# Patient Record
Sex: MALE | Race: WHITE | NOT HISPANIC OR LATINO
[De-identification: names, ages, dates, MRNs, and addresses within clinical notes are randomized per-mention and may not be internally consistent; named-entity substitution may affect disease eponyms.]

---

## 2022-04-12 ENCOUNTER — APPOINTMENT (OUTPATIENT)
Dept: SURGERY | Facility: CLINIC | Age: 57
End: 2022-04-12
Payer: COMMERCIAL

## 2022-04-12 VITALS
SYSTOLIC BLOOD PRESSURE: 143 MMHG | OXYGEN SATURATION: 99 % | HEIGHT: 72 IN | TEMPERATURE: 97.6 F | HEART RATE: 81 BPM | RESPIRATION RATE: 17 BRPM | BODY MASS INDEX: 32.51 KG/M2 | DIASTOLIC BLOOD PRESSURE: 89 MMHG | WEIGHT: 240 LBS

## 2022-04-12 DIAGNOSIS — K42.9 UMBILICAL HERNIA W/OUT OBSTRUCTION OR GANGRENE: ICD-10-CM

## 2022-04-12 DIAGNOSIS — Z87.891 PERSONAL HISTORY OF NICOTINE DEPENDENCE: ICD-10-CM

## 2022-04-12 DIAGNOSIS — Z87.39 PERSONAL HISTORY OF OTHER DISEASES OF THE MUSCULOSKELETAL SYSTEM AND CONNECTIVE TISSUE: ICD-10-CM

## 2022-04-12 PROCEDURE — 99203 OFFICE O/P NEW LOW 30 MIN: CPT

## 2022-04-12 RX ORDER — ADALIMUMAB 20MG/0.4ML
20 KIT SUBCUTANEOUS
Refills: 0 | Status: ACTIVE | COMMUNITY

## 2022-04-12 RX ORDER — ATORVASTATIN CALCIUM 80 MG/1
TABLET, FILM COATED ORAL
Refills: 0 | Status: ACTIVE | COMMUNITY

## 2022-04-12 RX ORDER — ALPRAZOLAM 2 MG/1
TABLET ORAL
Refills: 0 | Status: ACTIVE | COMMUNITY

## 2022-04-12 NOTE — CONSULT LETTER
[Dear  ___] : Dear  [unfilled], [Consult Letter:] : I had the pleasure of evaluating your patient, [unfilled]. [Please see my note below.] : Please see my note below. [Consult Closing:] : Thank you very much for allowing me to participate in the care of this patient.  If you have any questions, please do not hesitate to contact me. [Sincerely,] : Sincerely, [FreeTextEntry3] : Gene Gray M.D., F.A.C.S, F.A.S.C.R.S

## 2022-04-12 NOTE — PHYSICAL EXAM
[Normal Breath Sounds] : Normal breath sounds [Normal Heart Sounds] : normal heart sounds [No Rash or Lesion] : No rash or lesion [Alert] : alert [Oriented to Person] : oriented to person [Oriented to Place] : oriented to place [Oriented to Time] : oriented to time [Calm] : calm [JVD] : no jugular venous distention  [No HSM] : no hepatosplenomegaly [de-identified] : wnl [de-identified] : wnl [de-identified] : Soft, obese nontender, small fat-containing reducible umbilical hernia [de-identified] : full rom

## 2022-04-12 NOTE — ASSESSMENT
[FreeTextEntry1] : In summary the patient has a small symptomatic fat-containing reducible umbilical hernia.  I recommended that this be electively repaired with mesh.  I explained the risks benefits and alternatives of surgery including the rare complications of bleeding mesh infection and recurrence.

## 2022-05-06 ENCOUNTER — APPOINTMENT (OUTPATIENT)
Dept: OTOLARYNGOLOGY | Facility: CLINIC | Age: 57
End: 2022-05-06
Payer: COMMERCIAL

## 2022-05-06 VITALS
WEIGHT: 240 LBS | HEIGHT: 72 IN | DIASTOLIC BLOOD PRESSURE: 82 MMHG | SYSTOLIC BLOOD PRESSURE: 132 MMHG | BODY MASS INDEX: 32.51 KG/M2 | HEART RATE: 87 BPM

## 2022-05-06 DIAGNOSIS — Z78.9 OTHER SPECIFIED HEALTH STATUS: ICD-10-CM

## 2022-05-06 PROCEDURE — 31575 DIAGNOSTIC LARYNGOSCOPY: CPT

## 2022-05-06 PROCEDURE — 99244 OFF/OP CNSLTJ NEW/EST MOD 40: CPT | Mod: 25

## 2022-05-06 NOTE — PHYSICAL EXAM
[Midline] : trachea located in midline position [Normal] : no rashes [] : septum deviated to the left [Laryngoscopy Performed] : laryngoscopy was performed, see procedure section for findings [FreeTextEntry1] : Obese, HODAN unable to tolerate CPAP [de-identified] : Thick [de-identified] : Hypertrophy

## 2022-05-06 NOTE — HISTORY OF PRESENT ILLNESS
[de-identified] : 56 year old male here for intial consultation for sleep apnea/Inspire \par Patient's BMI 32.55 \par Reports using CPAP machine\par Reports removal of CPAP machine in the night\par Reports CPAP machine is annoying and uncomfortable \par Reports attempting using mouth guard in the past but states it was uncomfortable. \par Reports alcohol and occasionally smokes cigars. \par Patient has tried various types of masks without relief\par States mask cause skin irritation, dry mouth and nose bleeds \par Patient has sleeping partner who complains about the noise of CPAP machine, stating it keeps them awake at night.\par Reports weight loss of 40lbs with with no relief from sleep apnea\par Patient comorbidities include daytime fatigue, difficulty driving.

## 2022-05-06 NOTE — DATA REVIEWED
[de-identified] : Patient's sleep test was severe enough to warrant CPAP but does not know any numbers.

## 2022-05-06 NOTE — CONSULT LETTER
[Dear  ___] : Dear  [unfilled], [Consult Letter:] : I had the pleasure of evaluating your patient, [unfilled]. [Please see my note below.] : Please see my note below. [Consult Closing:] : Thank you very much for allowing me to participate in the care of this patient.  If you have any questions, please do not hesitate to contact me. [Sincerely,] : Sincerely, [FreeTextEntry3] : Yobany Perez MD, LENARD, FACS\par  Department Otolaryngology\par Director of [facility]\par Professor of Otolaryngology, \par [employer]\par

## 2022-05-06 NOTE — PROCEDURE
[Video Captured] : video captured and filed [Image(s) Captured] : image(s) captured and filed [Unable to Cooperate with Mirror] : patient unable to cooperate with mirror [Obstruction] : acute airway obstruction evaluation [Complicated Symptoms] : complicated symptoms requiring more thorough examination than provided by mirror [Topical Lidocaine] : topical lidocaine [Oxymetazoline HCl] : oxymetazoline HCl [Flexible Endoscope] : examined with the flexible endoscope [Serial Number: ___] : Serial Number: [unfilled] [Velopharynx ___ %] : the velopharynx was [unfilled]U% collapsed [Normal] : the false vocal folds were pink and regular, the ventricular sulcus was open, the true vocal folds were glistening white, tense and of equal length, mobility, and height [True Vocal Cords Paralysis] : no true vocal cord paralysis [True Vocal Cords Erythematous] : no true vocal cord edema [True Vocal Cords Acosta's Nodules] : no true vocal cord nodules [Glottis Arytenoid Cartilages] : no arytenoid granulomas [Glottis Arytenoid Cartilages Erythema] : no arytenoid erythema [Arytenoid Edema ___ /4] : bilaterally were normal [Arytenoid Erythema ___ /4] : bilaterally were normal [de-identified] : Patient was placed in the examination chair in a sitting position. The nose was decongested with oxymetazoline nasal solution. The airway was anesthetized with 4% Xylocaine.  The back of the throat was anesthetized with Cetacaine. Direct flexible/rigid video endoscopy was performed. Findings revealed:\par Patient has a deviated septum to the left nasopharynx positive Rockwell maneuver appearing to close in an anterior to posterior direction thick base of tongue larynx epiglottis vocal cords normal. [de-identified] : HODAN

## 2022-05-11 ENCOUNTER — NON-APPOINTMENT (OUTPATIENT)
Age: 57
End: 2022-05-11

## 2022-05-12 ENCOUNTER — OUTPATIENT (OUTPATIENT)
Dept: OUTPATIENT SERVICES | Facility: HOSPITAL | Age: 57
LOS: 1 days | End: 2022-05-12
Payer: COMMERCIAL

## 2022-05-12 VITALS
TEMPERATURE: 98 F | WEIGHT: 240.08 LBS | RESPIRATION RATE: 18 BRPM | OXYGEN SATURATION: 98 % | HEART RATE: 72 BPM | DIASTOLIC BLOOD PRESSURE: 80 MMHG | SYSTOLIC BLOOD PRESSURE: 110 MMHG | HEIGHT: 70.5 IN

## 2022-05-12 DIAGNOSIS — G47.33 OBSTRUCTIVE SLEEP APNEA (ADULT) (PEDIATRIC): ICD-10-CM

## 2022-05-12 DIAGNOSIS — Z98.890 OTHER SPECIFIED POSTPROCEDURAL STATES: Chronic | ICD-10-CM

## 2022-05-12 LAB
ALBUMIN SERPL ELPH-MCNC: 4.3 G/DL — SIGNIFICANT CHANGE UP (ref 3.3–5)
ALP SERPL-CCNC: 82 U/L — SIGNIFICANT CHANGE UP (ref 40–120)
ALT FLD-CCNC: 49 U/L — HIGH (ref 4–41)
ANION GAP SERPL CALC-SCNC: 14 MMOL/L — SIGNIFICANT CHANGE UP (ref 7–14)
AST SERPL-CCNC: 42 U/L — HIGH (ref 4–40)
BILIRUB SERPL-MCNC: 0.4 MG/DL — SIGNIFICANT CHANGE UP (ref 0.2–1.2)
BUN SERPL-MCNC: 14 MG/DL — SIGNIFICANT CHANGE UP (ref 7–23)
CALCIUM SERPL-MCNC: 9.7 MG/DL — SIGNIFICANT CHANGE UP (ref 8.4–10.5)
CHLORIDE SERPL-SCNC: 101 MMOL/L — SIGNIFICANT CHANGE UP (ref 98–107)
CO2 SERPL-SCNC: 24 MMOL/L — SIGNIFICANT CHANGE UP (ref 22–31)
CREAT SERPL-MCNC: 0.87 MG/DL — SIGNIFICANT CHANGE UP (ref 0.5–1.3)
EGFR: 102 ML/MIN/1.73M2 — SIGNIFICANT CHANGE UP
GLUCOSE SERPL-MCNC: 75 MG/DL — SIGNIFICANT CHANGE UP (ref 70–99)
HCT VFR BLD CALC: 44.2 % — SIGNIFICANT CHANGE UP (ref 39–50)
HGB BLD-MCNC: 14.5 G/DL — SIGNIFICANT CHANGE UP (ref 13–17)
MCHC RBC-ENTMCNC: 28.3 PG — SIGNIFICANT CHANGE UP (ref 27–34)
MCHC RBC-ENTMCNC: 32.8 GM/DL — SIGNIFICANT CHANGE UP (ref 32–36)
MCV RBC AUTO: 86.2 FL — SIGNIFICANT CHANGE UP (ref 80–100)
NRBC # BLD: 0 /100 WBCS — SIGNIFICANT CHANGE UP
NRBC # FLD: 0 K/UL — SIGNIFICANT CHANGE UP
PLATELET # BLD AUTO: 275 K/UL — SIGNIFICANT CHANGE UP (ref 150–400)
POTASSIUM SERPL-MCNC: 3.6 MMOL/L — SIGNIFICANT CHANGE UP (ref 3.5–5.3)
POTASSIUM SERPL-SCNC: 3.6 MMOL/L — SIGNIFICANT CHANGE UP (ref 3.5–5.3)
PROT SERPL-MCNC: 8.6 G/DL — HIGH (ref 6–8.3)
RBC # BLD: 5.13 M/UL — SIGNIFICANT CHANGE UP (ref 4.2–5.8)
RBC # FLD: 12.9 % — SIGNIFICANT CHANGE UP (ref 10.3–14.5)
SODIUM SERPL-SCNC: 139 MMOL/L — SIGNIFICANT CHANGE UP (ref 135–145)
WBC # BLD: 7.06 K/UL — SIGNIFICANT CHANGE UP (ref 3.8–10.5)
WBC # FLD AUTO: 7.06 K/UL — SIGNIFICANT CHANGE UP (ref 3.8–10.5)

## 2022-05-12 PROCEDURE — 93010 ELECTROCARDIOGRAM REPORT: CPT

## 2022-05-12 RX ORDER — SODIUM CHLORIDE 9 MG/ML
1000 INJECTION, SOLUTION INTRAVENOUS
Refills: 0 | Status: DISCONTINUED | OUTPATIENT
Start: 2022-05-17 | End: 2022-05-31

## 2022-05-12 NOTE — H&P PST ADULT - NSICDXFAMILYHX_GEN_ALL_CORE_FT
FAMILY HISTORY:  Sibling  Still living? Yes, Estimated age: Age Unknown  FH: heart attack, Age at diagnosis: Age Unknown

## 2022-05-12 NOTE — H&P PST ADULT - PROBLEM SELECTOR PLAN 1
Pt. is scheduled for a drug induced sleep endoscopy 5/17/22.  Pt., verbalized understanding of instructions.

## 2022-05-12 NOTE — H&P PST ADULT - NSICDXPASTMEDICALHX_GEN_ALL_CORE_FT
PAST MEDICAL HISTORY:  2019 novel coronavirus disease (COVID-19) 1/2022    Ankylosing spondylitis     Crohn's disease     Hyperlipidemia     Sleep apnea      PAST MEDICAL HISTORY:  2019 novel coronavirus disease (COVID-19) 1/2022    Ankylosing spondylitis     Crohn's disease     Hyperlipidemia     Iritis of right eye     Obese     Sleep apnea

## 2022-05-14 LAB — SARS-COV-2 N GENE NPH QL NAA+PROBE: NOT DETECTED

## 2022-05-16 ENCOUNTER — TRANSCRIPTION ENCOUNTER (OUTPATIENT)
Age: 57
End: 2022-05-16

## 2022-05-16 NOTE — ASU PATIENT PROFILE, ADULT - NSICDXPASTMEDICALHX_GEN_ALL_CORE_FT
PAST MEDICAL HISTORY:  2019 novel coronavirus disease (COVID-19) 1/2022    Ankylosing spondylitis     Crohn's disease     Hyperlipidemia     Iritis of right eye     Obese     Sleep apnea

## 2022-05-16 NOTE — ASU PATIENT PROFILE, ADULT - NS MD HP PRESSURE ULCER DRAIN
DISPLAY PLAN FREE TEXT
no

## 2022-05-16 NOTE — ASU PATIENT PROFILE, ADULT - FALL HARM RISK - UNIVERSAL INTERVENTIONS
Bed in lowest position, wheels locked, appropriate side rails in place/Call bell, personal items and telephone in reach/Instruct patient to call for assistance before getting out of bed or chair/Non-slip footwear when patient is out of bed/[place] to call system/Physically safe environment - no spills, clutter or unnecessary equipment/Purposeful Proactive Rounding/Room/bathroom lighting operational, light cord in reach

## 2022-05-16 NOTE — ASU PATIENT PROFILE, ADULT - VISION (WITH CORRECTIVE LENSES IF THE PATIENT USUALLY WEARS THEM):
wear glaasses/Normal vision: sees adequately in most situations; can see medication labels, newsprint

## 2022-05-17 ENCOUNTER — OUTPATIENT (OUTPATIENT)
Dept: OUTPATIENT SERVICES | Facility: HOSPITAL | Age: 57
LOS: 1 days | Discharge: HOME | End: 2022-05-17
Payer: COMMERCIAL

## 2022-05-17 ENCOUNTER — TRANSCRIPTION ENCOUNTER (OUTPATIENT)
Age: 57
End: 2022-05-17

## 2022-05-17 ENCOUNTER — APPOINTMENT (OUTPATIENT)
Dept: OTOLARYNGOLOGY | Facility: HOSPITAL | Age: 57
End: 2022-05-17

## 2022-05-17 VITALS
WEIGHT: 240.08 LBS | OXYGEN SATURATION: 95 % | RESPIRATION RATE: 16 BRPM | HEIGHT: 70.5 IN | TEMPERATURE: 98 F | DIASTOLIC BLOOD PRESSURE: 80 MMHG | SYSTOLIC BLOOD PRESSURE: 123 MMHG | HEART RATE: 82 BPM

## 2022-05-17 VITALS
RESPIRATION RATE: 18 BRPM | SYSTOLIC BLOOD PRESSURE: 144 MMHG | DIASTOLIC BLOOD PRESSURE: 83 MMHG | TEMPERATURE: 98 F | HEART RATE: 77 BPM | OXYGEN SATURATION: 99 %

## 2022-05-17 DIAGNOSIS — Z98.890 OTHER SPECIFIED POSTPROCEDURAL STATES: Chronic | ICD-10-CM

## 2022-05-17 DIAGNOSIS — G47.33 OBSTRUCTIVE SLEEP APNEA (ADULT) (PEDIATRIC): ICD-10-CM

## 2022-05-17 PROCEDURE — 42975 DISE EVAL SLP DO BRTH FLX DX: CPT | Mod: GC

## 2022-05-17 NOTE — ASU DISCHARGE PLAN (ADULT/PEDIATRIC) - FOLLOW UP APPOINTMENTS
601 May also call Recovery Room (PACU) 24/7 @ [phone]/[facility]

## 2022-05-17 NOTE — ASU DISCHARGE PLAN (ADULT/PEDIATRIC) - NS MD DC FALL RISK RISK
For information on Fall & Injury Prevention, visit: [email or web address] OR  [email or web address] OR  https://www.cdc.gov/steadi/patient.html

## 2022-05-17 NOTE — ASU DISCHARGE PLAN (ADULT/PEDIATRIC) - CARE PROVIDER_API CALL
Yobany Perez; LENARD)  Otolaryngology  [address] [place] [id number]  Phone: [phone]  Fax: [phone]  Follow Up Time: 1 week

## 2022-06-09 ENCOUNTER — APPOINTMENT (OUTPATIENT)
Dept: PULMONOLOGY | Facility: CLINIC | Age: 57
End: 2022-06-09
Payer: COMMERCIAL

## 2022-06-09 VITALS
SYSTOLIC BLOOD PRESSURE: 125 MMHG | OXYGEN SATURATION: 95 % | HEART RATE: 85 BPM | WEIGHT: 238.31 LBS | BODY MASS INDEX: 32.28 KG/M2 | RESPIRATION RATE: 15 BRPM | TEMPERATURE: 97.4 F | HEIGHT: 72 IN | DIASTOLIC BLOOD PRESSURE: 84 MMHG

## 2022-06-09 PROCEDURE — 99203 OFFICE O/P NEW LOW 30 MIN: CPT

## 2022-06-09 NOTE — HISTORY OF PRESENT ILLNESS
[FreeTextEntry1] : 58 yo M presents for initial evaluation of sleep disordered breathing\par Referred by Dr. Perez. \par PMH: Crohn's disease, Ankylosing spondylitis on Humira, chronic Insomnia for which he takes Xanax, HLD\par Diagnosed with HODAN approx 3 years ago, first tried OMAD which he couldn't tolerate and on CPAP therapy since, has used a FFM - very uncomfortable, now using NP. Machine/pressure and mask\par Meds: Xanax, Humira, lipitor\par Tried multiple medications for insomnia - Did not tolerate Ambien - gave him "bad thoughts"\par \par Sleep history: \par Main complaints of nonrestorative sleep, severe snoring and daytime somnolence.\par Bed: 12 am, no difficultly falling asleep, wakes 1-2x to urinate, out of bed at 8 am \par Unrefreshed upon awakening. 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 \par EDS with ESS of 10\par \par Quality metrics:\par Tobacco use- never \par ESS 10\par \par Vaccines: \par COVID: Pfizerx3, booster August 2021\par Influenza: fall 2021\par Pneumococcal: not yet \par \par

## 2022-06-09 NOTE — CONSULT LETTER
[Dear  ___] : Dear  [unfilled], [Consult Letter:] : I had the pleasure of evaluating your patient, [unfilled]. [( Thank you for referring [unfilled] for consultation for _____ )] : Thank you for referring [unfilled] for consultation for [unfilled] [Please see my note below.] : Please see my note below. [Consult Closing:] : Thank you very much for allowing me to participate in the care of this patient.  If you have any questions, please do not hesitate to contact me. [Sincerely,] : Sincerely, [FreeTextEntry2] : Dr. Yobany Perez [FreeTextEntry3] : Brenda Person MD, FAASM\par  of Medicine\par Associate , Fellowship in Pulmonary and Critical Care Medicine\par Division of Pulmonary, Critical Care & Sleep Medicine\par [employer] [facility]\par \par \par  [DrVictoriano  ___] : Dr. ARMENTA

## 2022-06-09 NOTE — PHYSICAL EXAM
[General Appearance - Well Developed] : well developed [General Appearance - Well Nourished] : well nourished [General Appearance - In No Acute Distress] : no acute distress [Normal Conjunctiva] : the conjunctiva exhibited no abnormalities [Eyelids - No Xanthelasma] : the eyelids demonstrated no xanthelasmas [Low Lying Soft Palate] : low lying soft palate [Enlarged Base of the Tongue] : enlargement of the base of the tongue [II] : II [Neck Appearance] : the appearance of the neck was normal [Heart Rate And Rhythm] : heart rate was normal and rhythm regular [Heart Sounds] : normal S1 and S2 [] : no respiratory distress [Respiration, Rhythm And Depth] : normal respiratory rhythm and effort [Exaggerated Use Of Accessory Muscles For Inspiration] : no accessory muscle use [Auscultation Breath Sounds / Voice Sounds] : lungs were clear to auscultation bilaterally [Abnormal Walk] : normal gait [Motor Tone] : muscle strength and tone were normal [Nail Clubbing] : no clubbing of the fingernails [Cyanosis, Localized] : no localized cyanosis [Skin Color & Pigmentation] : normal skin color and pigmentation [Skin Lesions] : no skin lesions

## 2022-06-09 NOTE — REVIEW OF SYSTEMS
[Obesity] : obesity [Difficulty Initiating Sleep] : difficulty falling asleep [Difficulty Maintaining Sleep] : no difficulty maintaining sleep [Chronic Insomnia] : chronic  insomnia [Lower Extremity Discomfort] : no lower extremity discomfort [Unusual Sleep Behavior] : no unusual sleep behavior [Negative] : Psychiatric [FreeTextEntry3] : per hpi [FreeTextEntry8] : per hpi [FreeTextEntry6] : per hpi

## 2022-06-09 NOTE — ASSESSMENT
[FreeTextEntry1] : 56 yo M with HODAN on CPAP therapy, who presents for Inspire evaluation. He is intolerant of noninvasive therapy including oral appliance and CPAP.\par Has been making lifestyle modifications the past 6 months and  has lost approximately 40 lbs\par Continues to be symptomatic with snoring and daytime sleepiness.\par Plan:\par Plan:\par Will send the patient for an HST at the [facility] sleep disorders center to evaluate for the presence of sleep disordered breathing. \par Explained the rationale for diagnosis and treatment of sleep apnea including its effect on quality of life and long term cardiovascular risk. \par Above discussed at length, all questions answered, he appears to understand and will call for results 1 week after completion of the study.\par \par Once a baseline HST is completed, he will likely be referred back to Dr. Perez for further evaluation\par Continued lifestyle modifications for weight loss, BMI is 32 now.  \par

## 2022-06-16 PROBLEM — E78.5 HYPERLIPIDEMIA, UNSPECIFIED: Chronic | Status: ACTIVE | Noted: 2022-05-12

## 2022-07-26 ENCOUNTER — FORM ENCOUNTER (OUTPATIENT)
Age: 57
End: 2022-07-26

## 2022-07-27 ENCOUNTER — OUTPATIENT (OUTPATIENT)
Dept: OUTPATIENT SERVICES | Facility: HOSPITAL | Age: 57
LOS: 1 days | End: 2022-07-27
Payer: COMMERCIAL

## 2022-07-27 ENCOUNTER — APPOINTMENT (OUTPATIENT)
Dept: SLEEP CENTER | Facility: CLINIC | Age: 57
End: 2022-07-27

## 2022-07-27 DIAGNOSIS — Z98.890 OTHER SPECIFIED POSTPROCEDURAL STATES: Chronic | ICD-10-CM

## 2022-07-27 PROCEDURE — 95806 SLEEP STUDY UNATT&RESP EFFT: CPT | Mod: 26

## 2022-07-27 PROCEDURE — 95806 SLEEP STUDY UNATT&RESP EFFT: CPT

## 2022-08-11 ENCOUNTER — NON-APPOINTMENT (OUTPATIENT)
Age: 57
End: 2022-08-11

## 2022-08-12 ENCOUNTER — APPOINTMENT (OUTPATIENT)
Dept: PULMONOLOGY | Facility: CLINIC | Age: 57
End: 2022-08-12

## 2022-08-12 PROCEDURE — 99442: CPT

## 2022-08-24 ENCOUNTER — APPOINTMENT (OUTPATIENT)
Dept: PULMONOLOGY | Facility: CLINIC | Age: 57
End: 2022-08-24

## 2022-08-31 ENCOUNTER — APPOINTMENT (OUTPATIENT)
Dept: OTOLARYNGOLOGY | Facility: CLINIC | Age: 57
End: 2022-08-31

## 2022-08-31 VITALS
SYSTOLIC BLOOD PRESSURE: 118 MMHG | HEIGHT: 72 IN | BODY MASS INDEX: 31.42 KG/M2 | DIASTOLIC BLOOD PRESSURE: 75 MMHG | WEIGHT: 232 LBS | HEART RATE: 92 BPM

## 2022-08-31 DIAGNOSIS — G47.33 OBSTRUCTIVE SLEEP APNEA (ADULT) (PEDIATRIC): ICD-10-CM

## 2022-08-31 DIAGNOSIS — E66.9 OBESITY, UNSPECIFIED: ICD-10-CM

## 2022-08-31 PROCEDURE — 99214 OFFICE O/P EST MOD 30 MIN: CPT

## 2022-08-31 NOTE — HISTORY OF PRESENT ILLNESS
[de-identified] : 57 year old male follow up for Inspire.  DISE 5/17/22 positive.  PSG [place] 28.5 and central and mixed apneas are less than 25% of total events.

## 2022-08-31 NOTE — PHYSICAL EXAM
[] : septum deviated to the left [Midline] : trachea located in midline position [Laryngoscopy Performed] : laryngoscopy was performed, see procedure section for findings [Normal] : no rashes [FreeTextEntry1] : Obese, HODAN unable to tolerate CPAP [de-identified] : Thick [de-identified] : Hypertrophy

## 2022-08-31 NOTE — CONSULT LETTER
[Dear  ___] : Dear  [unfilled], [Courtesy Letter:] : I had the pleasure of seeing your patient, [unfilled], in my office today. [Please see my note below.] : Please see my note below. [Sincerely,] : Sincerely, [FreeTextEntry2] : Sincere [place] [FreeTextEntry3] : Yobany Perez MD, LENARD, FACS\par  Department Otolaryngology\par Director of [facility]\par Professor of Otolaryngology, \par [employer]\par

## 2022-09-12 DIAGNOSIS — G47.33 OBSTRUCTIVE SLEEP APNEA (ADULT) (PEDIATRIC): ICD-10-CM

## 2022-09-20 ENCOUNTER — OUTPATIENT (OUTPATIENT)
Dept: OUTPATIENT SERVICES | Facility: HOSPITAL | Age: 57
LOS: 1 days | End: 2022-09-20
Payer: COMMERCIAL

## 2022-09-20 VITALS
DIASTOLIC BLOOD PRESSURE: 84 MMHG | HEART RATE: 87 BPM | WEIGHT: 237.66 LBS | SYSTOLIC BLOOD PRESSURE: 136 MMHG | HEIGHT: 72 IN | TEMPERATURE: 97 F | RESPIRATION RATE: 16 BRPM | OXYGEN SATURATION: 97 %

## 2022-09-20 DIAGNOSIS — Z98.890 OTHER SPECIFIED POSTPROCEDURAL STATES: Chronic | ICD-10-CM

## 2022-09-20 DIAGNOSIS — K42.9 UMBILICAL HERNIA WITHOUT OBSTRUCTION OR GANGRENE: ICD-10-CM

## 2022-09-20 DIAGNOSIS — G47.30 SLEEP APNEA, UNSPECIFIED: ICD-10-CM

## 2022-09-20 DIAGNOSIS — Z01.818 ENCOUNTER FOR OTHER PREPROCEDURAL EXAMINATION: ICD-10-CM

## 2022-09-20 LAB
ANION GAP SERPL CALC-SCNC: 12 MMOL/L — SIGNIFICANT CHANGE UP (ref 5–17)
BUN SERPL-MCNC: 15 MG/DL — SIGNIFICANT CHANGE UP (ref 7–23)
CALCIUM SERPL-MCNC: 9.4 MG/DL — SIGNIFICANT CHANGE UP (ref 8.4–10.5)
CHLORIDE SERPL-SCNC: 99 MMOL/L — SIGNIFICANT CHANGE UP (ref 96–108)
CO2 SERPL-SCNC: 26 MMOL/L — SIGNIFICANT CHANGE UP (ref 22–31)
CREAT SERPL-MCNC: 0.85 MG/DL — SIGNIFICANT CHANGE UP (ref 0.5–1.3)
EGFR: 102 ML/MIN/1.73M2 — SIGNIFICANT CHANGE UP
GLUCOSE SERPL-MCNC: 77 MG/DL — SIGNIFICANT CHANGE UP (ref 70–99)
HCT VFR BLD CALC: 44 % — SIGNIFICANT CHANGE UP (ref 39–50)
HGB BLD-MCNC: 14.6 G/DL — SIGNIFICANT CHANGE UP (ref 13–17)
MCHC RBC-ENTMCNC: 27.7 PG — SIGNIFICANT CHANGE UP (ref 27–34)
MCHC RBC-ENTMCNC: 33.2 GM/DL — SIGNIFICANT CHANGE UP (ref 32–36)
MCV RBC AUTO: 83.3 FL — SIGNIFICANT CHANGE UP (ref 80–100)
NRBC # BLD: 0 /100 WBCS — SIGNIFICANT CHANGE UP (ref 0–0)
PLATELET # BLD AUTO: 264 K/UL — SIGNIFICANT CHANGE UP (ref 150–400)
POTASSIUM SERPL-MCNC: 3.8 MMOL/L — SIGNIFICANT CHANGE UP (ref 3.5–5.3)
POTASSIUM SERPL-SCNC: 3.8 MMOL/L — SIGNIFICANT CHANGE UP (ref 3.5–5.3)
RBC # BLD: 5.28 M/UL — SIGNIFICANT CHANGE UP (ref 4.2–5.8)
RBC # FLD: 13.1 % — SIGNIFICANT CHANGE UP (ref 10.3–14.5)
SODIUM SERPL-SCNC: 137 MMOL/L — SIGNIFICANT CHANGE UP (ref 135–145)
WBC # BLD: 8.69 K/UL — SIGNIFICANT CHANGE UP (ref 3.8–10.5)
WBC # FLD AUTO: 8.69 K/UL — SIGNIFICANT CHANGE UP (ref 3.8–10.5)

## 2022-09-20 PROCEDURE — 80048 BASIC METABOLIC PNL TOTAL CA: CPT

## 2022-09-20 PROCEDURE — 36415 COLL VENOUS BLD VENIPUNCTURE: CPT

## 2022-09-20 PROCEDURE — G0463: CPT

## 2022-09-20 PROCEDURE — 85027 COMPLETE CBC AUTOMATED: CPT

## 2022-09-20 RX ORDER — SODIUM CHLORIDE 9 MG/ML
1000 INJECTION, SOLUTION INTRAVENOUS
Refills: 0 | Status: DISCONTINUED | OUTPATIENT
Start: 2022-10-06 | End: 2022-10-20

## 2022-09-20 RX ORDER — SODIUM CHLORIDE 9 MG/ML
3 INJECTION INTRAMUSCULAR; INTRAVENOUS; SUBCUTANEOUS EVERY 8 HOURS
Refills: 0 | Status: DISCONTINUED | OUTPATIENT
Start: 2022-10-06 | End: 2022-10-20

## 2022-09-20 RX ORDER — PREDNISOLONE SODIUM PHOSPHATE 1 %
1 DROPS OPHTHALMIC (EYE)
Qty: 0 | Refills: 0 | DISCHARGE

## 2022-09-20 NOTE — H&P PST ADULT - ACTIVITY
walks 30 minutes daily, pretty active to loose weight, utilizes stairs in the home multiple times a day

## 2022-09-20 NOTE — H&P PST ADULT - NSICDXPASTSURGICALHX_GEN_ALL_CORE_FT
PAST SURGICAL HISTORY:  History of excision of pilonidal cyst 1992    S/P ACL repair left-2014    S/P endoscopy s/p  drug induced endoscopy, tested to find out whether fit for inspire sleep device

## 2022-09-20 NOTE — H&P PST ADULT - NEUROLOGICAL
cranial nerves II-XII intact/sensation intact/responds to pain/responds to verbal commands/cranial nerves intact negative

## 2022-09-20 NOTE — H&P PST ADULT - MUSCULOSKELETAL
ROM intact/no joint swelling/no joint erythema/no calf tenderness/normal gait/strength 5/5 bilateral upper extremities/strength 5/5 bilateral lower extremities negative

## 2022-09-20 NOTE — H&P PST ADULT - ANESTHESIA, PREVIOUS REACTION, PROFILE
Date of Service: 02/28/2018    PREOPERATIVE DIAGNOSIS:  Right colon mass.    POSTOPERATIVE DIAGNOSIS:  Right colon mass.    PROCEDURE:  Laparoscopic-assisted right hemicolectomy.    ANESTHESIA:  General.    SURGEON:  Blu Siddiqi MD.    FIRST ASSISTANT:  Kiesha Little NP.    ASSISTANT:  Kiesha Little RN    ESTIMATED BLOOD LOSS:  10 mL.    DESCRIPTION OF PROCEDURE:  After general anesthesia was induced and endotracheal intubation performed, the patient was prepped and draped in standard sterile technique using ChloraPrep asepsis.  The abdomen was entered with an Optiview device and the abdomen was insufflated to 15 mmHg with CO2.  The adhesiolysis was performed and the small bowel was brought out of the pelvis.  The terminal ileum, cecum, ascending colon and the proximal transverse colon were all mobilized.  An Endo-SEAN 2.0 mm stapling device was used to compress the ileocolic vessels.  A small supraumbilical incision was made and a wound protector placed.  The bowel was brought out onto the abdomen.  The right branch of the middle colic was ligated with Vicryl suture.  The LigaSure device was used to take the rest of the mesentery.  The anastomosis was performed in a double staple technique using SEAN 3.8 mm staples.  Hemostasis was excellent.  The enteric contents were put under pressure.  There was no evidence of leak.  There was no evidence of ischemia.  The bowel was rinsed and placed back in the abdomen.  Gloves and instruments were changed and the fascia closed with #1 PDS suture.  The skin was closed using running 4-0 Monocryl subcuticular suture.  Dermabond was applied.  The patient was brought to the recovery room under the care of Department of Anesthesia.      Dictated By: Blu Siddiqi MD  Signing Provider: MD NOHEMY Vera/BENJAMIN ([id number])  DD: 02/28/2018 11:27:00 TD: 02/28/2018 11:40:37    Copy Sent To:   
denies family hx/none

## 2022-09-20 NOTE — H&P PST ADULT - FALL HARM RISK - UNIVERSAL INTERVENTIONS
Bed in lowest position, wheels locked, appropriate side rails in place/Call bell, personal items and telephone in reach/Instruct patient to call for assistance before getting out of bed or chair/Non-slip footwear when patient is out of bed/[place] to call system/Purposeful Proactive Rounding/Room/bathroom lighting operational, light cord in reach

## 2022-09-20 NOTE — H&P PST ADULT - NEGATIVE PSYCHIATRIC SYMPTOMS
no suicidal ideation/no depression/no anxiety/no insomnia/no memory loss/no paranoia/no mood swings/no agitation/no visual hallucinations/no auditory hallucinations

## 2022-09-20 NOTE — H&P PST ADULT - HISTORY OF PRESENT ILLNESS
57 year old male reports having some annoying discomfort from an umbilical bulge for 2 months , s/p surgical consult * scheduled for Umbilical hernia repair on 10/06/2022.  *preop covid testing on 10/02/22 .

## 2022-09-20 NOTE — H&P PST ADULT - PSYCHIATRIC COMMENTS
rarely take Xanax to sleep affect , insight & judgement intact, characteristics of appearance, behaviour & verbalization are appropriate, responsive to Meds

## 2022-09-20 NOTE — H&P PST ADULT - NSICDXPASTMEDICALHX_GEN_ALL_CORE_FT
PAST MEDICAL HISTORY:  2019 novel coronavirus disease (COVID-19) 1/2022 with Flu like symptoms 2 days    Ankylosing spondylitis h/o low back pain years ago,    Crohn's disease h/o  on Humira    Hyperlipidemia     Iritis of right eye h/o , last eye drop use 01/22    Sleep apnea moderate on C-pap    Umbilical hernia

## 2022-09-20 NOTE — H&P PST ADULT - RESPIRATORY AND THORAX COMMENTS
on C-pap now at night, waiting for New Inspire  sleep device since I am qualified for it as per study done on 05/17/22

## 2022-10-04 ENCOUNTER — OUTPATIENT (OUTPATIENT)
Dept: OUTPATIENT SERVICES | Facility: HOSPITAL | Age: 57
LOS: 1 days | End: 2022-10-04
Payer: COMMERCIAL

## 2022-10-04 DIAGNOSIS — Z98.890 OTHER SPECIFIED POSTPROCEDURAL STATES: Chronic | ICD-10-CM

## 2022-10-04 DIAGNOSIS — Z11.9 ENCOUNTER FOR SCREENING FOR INFECTIOUS AND PARASITIC DISEASES, UNSPECIFIED: ICD-10-CM

## 2022-10-04 LAB — SARS-COV-2 RNA SPEC QL NAA+PROBE: SIGNIFICANT CHANGE UP

## 2022-10-04 PROCEDURE — U0005: CPT

## 2022-10-04 PROCEDURE — U0003: CPT

## 2022-10-04 PROCEDURE — C9803: CPT

## 2022-10-05 ENCOUNTER — TRANSCRIPTION ENCOUNTER (OUTPATIENT)
Age: 57
End: 2022-10-05

## 2022-10-06 ENCOUNTER — RESULT REVIEW (OUTPATIENT)
Age: 57
End: 2022-10-06

## 2022-10-06 ENCOUNTER — OUTPATIENT (OUTPATIENT)
Dept: OUTPATIENT SERVICES | Facility: HOSPITAL | Age: 57
LOS: 1 days | End: 2022-10-06
Payer: COMMERCIAL

## 2022-10-06 ENCOUNTER — TRANSCRIPTION ENCOUNTER (OUTPATIENT)
Age: 57
End: 2022-10-06

## 2022-10-06 ENCOUNTER — APPOINTMENT (OUTPATIENT)
Dept: SURGERY | Facility: HOSPITAL | Age: 57
End: 2022-10-06

## 2022-10-06 VITALS
RESPIRATION RATE: 15 BRPM | HEART RATE: 74 BPM | OXYGEN SATURATION: 97 % | DIASTOLIC BLOOD PRESSURE: 81 MMHG | SYSTOLIC BLOOD PRESSURE: 134 MMHG

## 2022-10-06 VITALS
RESPIRATION RATE: 16 BRPM | OXYGEN SATURATION: 97 % | TEMPERATURE: 97 F | HEART RATE: 73 BPM | HEIGHT: 72 IN | DIASTOLIC BLOOD PRESSURE: 77 MMHG | WEIGHT: 237.66 LBS | SYSTOLIC BLOOD PRESSURE: 135 MMHG

## 2022-10-06 DIAGNOSIS — Z98.890 OTHER SPECIFIED POSTPROCEDURAL STATES: Chronic | ICD-10-CM

## 2022-10-06 DIAGNOSIS — Z01.818 ENCOUNTER FOR OTHER PREPROCEDURAL EXAMINATION: ICD-10-CM

## 2022-10-06 DIAGNOSIS — K42.9 UMBILICAL HERNIA WITHOUT OBSTRUCTION OR GANGRENE: ICD-10-CM

## 2022-10-06 PROCEDURE — C9399: CPT

## 2022-10-06 PROCEDURE — C1781: CPT

## 2022-10-06 PROCEDURE — 88302 TISSUE EXAM BY PATHOLOGIST: CPT | Mod: 26

## 2022-10-06 PROCEDURE — 88302 TISSUE EXAM BY PATHOLOGIST: CPT

## 2022-10-06 PROCEDURE — 49585: CPT

## 2022-10-06 DEVICE — MESH HERNIA VENTRAL PROCEED CIRCLE 4.3CM: Type: IMPLANTABLE DEVICE | Status: FUNCTIONAL

## 2022-10-06 RX ORDER — SODIUM CHLORIDE 9 MG/ML
1000 INJECTION, SOLUTION INTRAVENOUS
Refills: 0 | Status: DISCONTINUED | OUTPATIENT
Start: 2022-10-06 | End: 2022-10-20

## 2022-10-06 RX ORDER — OXYCODONE HYDROCHLORIDE 5 MG/1
1 TABLET ORAL
Qty: 6 | Refills: 0
Start: 2022-10-06 | End: 2022-10-06

## 2022-10-06 RX ORDER — CEFAZOLIN SODIUM 1 G
2000 VIAL (EA) INJECTION ONCE
Refills: 0 | Status: COMPLETED | OUTPATIENT
Start: 2022-10-06 | End: 2022-10-06

## 2022-10-06 RX ORDER — ONDANSETRON 8 MG/1
4 TABLET, FILM COATED ORAL ONCE
Refills: 0 | Status: DISCONTINUED | OUTPATIENT
Start: 2022-10-06 | End: 2022-10-20

## 2022-10-06 RX ORDER — OXYCODONE HYDROCHLORIDE 5 MG/1
5 TABLET ORAL ONCE
Refills: 0 | Status: DISCONTINUED | OUTPATIENT
Start: 2022-10-06 | End: 2022-10-06

## 2022-10-06 RX ORDER — CHLORHEXIDINE GLUCONATE 213 G/1000ML
1 SOLUTION TOPICAL ONCE
Refills: 0 | Status: COMPLETED | OUTPATIENT
Start: 2022-10-06 | End: 2022-10-06

## 2022-10-06 RX ORDER — LIDOCAINE HCL 20 MG/ML
0.2 VIAL (ML) INJECTION ONCE
Refills: 0 | Status: COMPLETED | OUTPATIENT
Start: 2022-10-06 | End: 2022-10-06

## 2022-10-06 RX ORDER — HYDROMORPHONE HYDROCHLORIDE 2 MG/ML
0.25 INJECTION INTRAMUSCULAR; INTRAVENOUS; SUBCUTANEOUS
Refills: 0 | Status: DISCONTINUED | OUTPATIENT
Start: 2022-10-06 | End: 2022-10-06

## 2022-10-06 RX ADMIN — CHLORHEXIDINE GLUCONATE 1 APPLICATION(S): 213 SOLUTION TOPICAL at 11:20

## 2022-10-06 RX ADMIN — SODIUM CHLORIDE 100 MILLILITER(S): 9 INJECTION, SOLUTION INTRAVENOUS at 11:19

## 2022-10-06 RX ADMIN — SODIUM CHLORIDE 3 MILLILITER(S): 9 INJECTION INTRAMUSCULAR; INTRAVENOUS; SUBCUTANEOUS at 11:12

## 2022-10-06 NOTE — ASU DISCHARGE PLAN (ADULT/PEDIATRIC) - CARE PROVIDER_API CALL
Gene Gray)  ColonRectal Surgery; Surgery  [address], [address]  [place] [address]  Phone: [phone]  Fax: [phone]  Follow Up Time: 1 week

## 2022-10-06 NOTE — BRIEF OPERATIVE NOTE - OPERATION/FINDINGS
Umbilical hernia reduced with mesh over a roughly 1 cm defect within the abdominal wall. Mesh sutured in place.

## 2022-10-06 NOTE — ASU DISCHARGE PLAN (ADULT/PEDIATRIC) - NS MD DC FALL RISK RISK
For information on Fall & Injury Prevention, visit: [email or web address] OR  [email or web address] OR  https://www.cdc.gov/steadi/patient.html

## 2022-10-06 NOTE — ASU DISCHARGE PLAN (ADULT/PEDIATRIC) - ASU DC SPECIAL INSTRUCTIONSFT
IMPORTANT: Please refer to Dr. Gray's printed instructions given to you in recovery for any questions or concerns regarding your postoperative care.

## 2022-10-06 NOTE — ASU DISCHARGE PLAN (ADULT/PEDIATRIC) - B. DRINK ALCOHOL, BEER, OR WINE
Started:X1 week     72 year old female present with a headache   Pt c/o nonproductive cough   Pt c/o nasal drainage   Pt c/o bilateral ear pressure   Pt used OTC medication and denies any relief    Statement Selected

## 2022-10-07 PROBLEM — G47.30 SLEEP APNEA, UNSPECIFIED: Chronic | Status: ACTIVE | Noted: 2022-05-12

## 2022-10-07 PROBLEM — K50.90 CROHN'S DISEASE, UNSPECIFIED, WITHOUT COMPLICATIONS: Chronic | Status: ACTIVE | Noted: 2022-05-12

## 2022-10-07 PROBLEM — H20.9 UNSPECIFIED IRIDOCYCLITIS: Chronic | Status: ACTIVE | Noted: 2022-05-12

## 2022-10-07 PROBLEM — U07.1 COVID-19: Chronic | Status: ACTIVE | Noted: 2022-05-12

## 2022-10-07 PROBLEM — K42.9 UMBILICAL HERNIA WITHOUT OBSTRUCTION OR GANGRENE: Chronic | Status: ACTIVE | Noted: 2022-09-20

## 2022-10-07 PROBLEM — M45.9 ANKYLOSING SPONDYLITIS OF UNSPECIFIED SITES IN SPINE: Chronic | Status: ACTIVE | Noted: 2022-05-12

## 2022-10-11 LAB — SURGICAL PATHOLOGY STUDY: SIGNIFICANT CHANGE UP

## 2022-10-18 ENCOUNTER — APPOINTMENT (OUTPATIENT)
Dept: SURGERY | Facility: CLINIC | Age: 57
End: 2022-10-18

## 2022-10-18 VITALS
SYSTOLIC BLOOD PRESSURE: 156 MMHG | TEMPERATURE: 97.4 F | RESPIRATION RATE: 17 BRPM | HEART RATE: 83 BPM | OXYGEN SATURATION: 98 % | DIASTOLIC BLOOD PRESSURE: 96 MMHG

## 2022-10-18 DIAGNOSIS — Z09 ENCOUNTER FOR FOLLOW-UP EXAMINATION AFTER COMPLETED TREATMENT FOR CONDITIONS OTHER THAN MALIGNANT NEOPLASM: ICD-10-CM

## 2022-10-18 PROCEDURE — 99024 POSTOP FOLLOW-UP VISIT: CPT

## 2022-10-18 RX ORDER — ATORVASTATIN CALCIUM 40 MG/1
40 TABLET, FILM COATED ORAL
Qty: 30 | Refills: 0 | Status: ACTIVE | COMMUNITY
Start: 2022-10-13

## 2022-10-18 NOTE — HISTORY OF PRESENT ILLNESS
[de-identified] : Odell is a 58 y/o male here for post-op visit. s/p Umbilical hernia repair on 10/6/22. Pathology - Umbilical hernia sac, excision: mesothelial-lined fibroconnective tissue. \par \par Today pt reports no pain / mild incision discomfort. Denies drainage, swelling, and redness of surgical incision. Denies nausea and vomiting. Denies fever and chills. Daily BM, formed, denies bleeding or swelling. Good appetite. Not taking anticoagulants.

## 2022-10-18 NOTE — ASSESSMENT
[FreeTextEntry1] : In summary the patient is doing well status post umbilical hernia repair with mesh.  I instructed him that he may resume his normal activities as tolerated except for heavy lifting which he should avoid for another couple of weeks.  As long as he feels well I only need to see him as needed

## 2022-10-18 NOTE — PHYSICAL EXAM
[de-identified] : Soft, nontender, incision healed without evidence of infection or persistent hernia.

## 2023-01-06 ENCOUNTER — OUTPATIENT (OUTPATIENT)
Dept: OUTPATIENT SERVICES | Facility: HOSPITAL | Age: 58
LOS: 1 days | End: 2023-01-06
Payer: COMMERCIAL

## 2023-01-06 VITALS
RESPIRATION RATE: 16 BRPM | OXYGEN SATURATION: 99 % | TEMPERATURE: 98 F | HEART RATE: 78 BPM | HEIGHT: 70.08 IN | WEIGHT: 242.51 LBS | SYSTOLIC BLOOD PRESSURE: 140 MMHG | DIASTOLIC BLOOD PRESSURE: 96 MMHG

## 2023-01-06 DIAGNOSIS — Z98.890 OTHER SPECIFIED POSTPROCEDURAL STATES: Chronic | ICD-10-CM

## 2023-01-06 DIAGNOSIS — R03.0 ELEVATED BLOOD-PRESSURE READING, WITHOUT DIAGNOSIS OF HYPERTENSION: ICD-10-CM

## 2023-01-06 DIAGNOSIS — G47.33 OBSTRUCTIVE SLEEP APNEA (ADULT) (PEDIATRIC): ICD-10-CM

## 2023-01-06 LAB
ALBUMIN SERPL ELPH-MCNC: 4.4 G/DL — SIGNIFICANT CHANGE UP (ref 3.3–5)
ALP SERPL-CCNC: 74 U/L — SIGNIFICANT CHANGE UP (ref 40–120)
ALT FLD-CCNC: 60 U/L — HIGH (ref 4–41)
ANION GAP SERPL CALC-SCNC: 12 MMOL/L — SIGNIFICANT CHANGE UP (ref 7–14)
AST SERPL-CCNC: 40 U/L — SIGNIFICANT CHANGE UP (ref 4–40)
BILIRUB SERPL-MCNC: 0.6 MG/DL — SIGNIFICANT CHANGE UP (ref 0.2–1.2)
BUN SERPL-MCNC: 16 MG/DL — SIGNIFICANT CHANGE UP (ref 7–23)
CALCIUM SERPL-MCNC: 9.7 MG/DL — SIGNIFICANT CHANGE UP (ref 8.4–10.5)
CHLORIDE SERPL-SCNC: 100 MMOL/L — SIGNIFICANT CHANGE UP (ref 98–107)
CO2 SERPL-SCNC: 26 MMOL/L — SIGNIFICANT CHANGE UP (ref 22–31)
CREAT SERPL-MCNC: 0.88 MG/DL — SIGNIFICANT CHANGE UP (ref 0.5–1.3)
EGFR: 101 ML/MIN/1.73M2 — SIGNIFICANT CHANGE UP
GLUCOSE SERPL-MCNC: 88 MG/DL — SIGNIFICANT CHANGE UP (ref 70–99)
HCT VFR BLD CALC: 44.8 % — SIGNIFICANT CHANGE UP (ref 39–50)
HGB BLD-MCNC: 15 G/DL — SIGNIFICANT CHANGE UP (ref 13–17)
MCHC RBC-ENTMCNC: 27.9 PG — SIGNIFICANT CHANGE UP (ref 27–34)
MCHC RBC-ENTMCNC: 33.5 GM/DL — SIGNIFICANT CHANGE UP (ref 32–36)
MCV RBC AUTO: 83.4 FL — SIGNIFICANT CHANGE UP (ref 80–100)
NRBC # BLD: 0 /100 WBCS — SIGNIFICANT CHANGE UP (ref 0–0)
NRBC # FLD: 0 K/UL — SIGNIFICANT CHANGE UP (ref 0–0)
PLATELET # BLD AUTO: 274 K/UL — SIGNIFICANT CHANGE UP (ref 150–400)
POTASSIUM SERPL-MCNC: 3.9 MMOL/L — SIGNIFICANT CHANGE UP (ref 3.5–5.3)
POTASSIUM SERPL-SCNC: 3.9 MMOL/L — SIGNIFICANT CHANGE UP (ref 3.5–5.3)
PROT SERPL-MCNC: 8.6 G/DL — HIGH (ref 6–8.3)
RBC # BLD: 5.37 M/UL — SIGNIFICANT CHANGE UP (ref 4.2–5.8)
RBC # FLD: 13.2 % — SIGNIFICANT CHANGE UP (ref 10.3–14.5)
SODIUM SERPL-SCNC: 138 MMOL/L — SIGNIFICANT CHANGE UP (ref 135–145)
WBC # BLD: 7.3 K/UL — SIGNIFICANT CHANGE UP (ref 3.8–10.5)
WBC # FLD AUTO: 7.3 K/UL — SIGNIFICANT CHANGE UP (ref 3.8–10.5)

## 2023-01-06 PROCEDURE — 93010 ELECTROCARDIOGRAM REPORT: CPT

## 2023-01-06 RX ORDER — SODIUM CHLORIDE 9 MG/ML
1000 INJECTION, SOLUTION INTRAVENOUS
Refills: 0 | Status: DISCONTINUED | OUTPATIENT
Start: 2023-01-17 | End: 2023-01-31

## 2023-01-06 RX ORDER — ADALIMUMAB 40MG/0.8ML
0.8 KIT SUBCUTANEOUS
Qty: 0 | Refills: 0 | DISCHARGE

## 2023-01-06 NOTE — H&P PST ADULT - PROBLEM SELECTOR PLAN 2
Requesting medical  evaluation for elevated blood pressure reading at [facility] today. Requesting medical  evaluation for elevated blood pressure reading at [facility] today.  Requesting echo and stress results report from cardiology.

## 2023-01-06 NOTE — H&P PST ADULT - LAST STRESS TEST
08/2022 by cardiologist, was normal per pt, advised follow up after 4 years" 08/2022 by cardiologist

## 2023-01-06 NOTE — H&P PST ADULT - MUSCULOSKELETAL
negative ROM intact/no joint swelling/no joint erythema/no calf tenderness/normal gait/strength 5/5 bilateral upper extremities/strength 5/5 bilateral lower extremities/extremities exam

## 2023-01-06 NOTE — H&P PST ADULT - RESPIRATORY AND THORAX COMMENTS
lvm to call back   on C-pap now at night, waiting for New Inspire  sleep device since I am qualified for it as per study done on 05/17/22 on C-pap now at night,. Pt says "I am waiting for New Inspire  sleep device since I am qualified for it ".

## 2023-01-06 NOTE — H&P PST ADULT - NSICDXPASTMEDICALHX_GEN_ALL_CORE_FT
PAST MEDICAL HISTORY:  2019 novel coronavirus disease (COVID-19) 1/2022 with Flu like symptoms 2 days    Ankylosing spondylitis h/o low back pain years ago,    Crohn's disease h/o  on Humira    Hyperlipidemia     Iritis of right eye h/o , last eye drop use 01/22    Sleep apnea moderate on C-pap    Umbilical hernia      PAST MEDICAL HISTORY:  2019 novel coronavirus disease (COVID-19) 1/2022 with Flu like symptoms 2 days    Ankylosing spondylitis h/o low back pain years ago,    Crohn's disease h/o  on Humira    Hyperlipidemia     Iritis of right eye h/o , last eye drop use 01/22    Obstructive sleep apnea (adult) (pediatric)     Sleep apnea moderate on C-pap    Umbilical hernia

## 2023-01-06 NOTE — H&P PST ADULT - PROBLEM SELECTOR PLAN 1
Patient tentatively scheduled for  neurostimulator electrode and generator and breathing sensor electrode on 01/13/2023.  Pre-op instructions provided. Pt given verbal and written instructions with teach back on pepcid. Pt verbalized understanding with return demonstration.    Pt strongly advised  for  COVID testing requirements to be done  3- 5 days prior to procedure. Pt was provided with information for covid testing locations in written form.   Pt verbalized understanding with return demonstration .    Labs done

## 2023-01-06 NOTE — H&P PST ADULT - RESPIRATORY
clear to auscultation bilaterally/no wheezes/no rales/no rhonchi/breath sounds equal clear to auscultation bilaterally/no wheezes/no rales/no rhonchi/no respiratory distress/breath sounds equal

## 2023-01-06 NOTE — H&P PST ADULT - HISTORY OF PRESENT ILLNESS
57 year old male with pre op dx of obstructive sleep apnea adult pediatric is scheduled for neurostimulator electrode and generator and breathing sensor electrode.

## 2023-01-16 ENCOUNTER — TRANSCRIPTION ENCOUNTER (OUTPATIENT)
Age: 58
End: 2023-01-16

## 2023-01-16 NOTE — ASU PATIENT PROFILE, ADULT - FALL HARM RISK - UNIVERSAL INTERVENTIONS
Bed in lowest position, wheels locked, appropriate side rails in place/Call bell, personal items and telephone in reach/Instruct patient to call for assistance before getting out of bed or chair/Non-slip footwear when patient is out of bed/[place] to call system/Physically safe environment - no spills, clutter or unnecessary equipment/Purposeful Proactive Rounding/Room/bathroom lighting operational, light cord in reach

## 2023-01-16 NOTE — ASU PATIENT PROFILE, ADULT - NSICDXPASTMEDICALHX_GEN_ALL_CORE_FT
PAST MEDICAL HISTORY:  2019 novel coronavirus disease (COVID-19) 1/2022 with Flu like symptoms 2 days    Ankylosing spondylitis h/o low back pain years ago,    Crohn's disease h/o  on Humira    Hyperlipidemia     Iritis of right eye h/o , last eye drop use 01/22    Obstructive sleep apnea (adult) (pediatric)     Sleep apnea moderate on C-pap    Umbilical hernia

## 2023-01-17 ENCOUNTER — RESULT REVIEW (OUTPATIENT)
Age: 58
End: 2023-01-17

## 2023-01-17 ENCOUNTER — OUTPATIENT (OUTPATIENT)
Dept: OUTPATIENT SERVICES | Facility: HOSPITAL | Age: 58
LOS: 1 days | Discharge: HOME | End: 2023-01-17
Payer: COMMERCIAL

## 2023-01-17 ENCOUNTER — APPOINTMENT (OUTPATIENT)
Dept: OTOLARYNGOLOGY | Facility: HOSPITAL | Age: 58
End: 2023-01-17

## 2023-01-17 ENCOUNTER — TRANSCRIPTION ENCOUNTER (OUTPATIENT)
Age: 58
End: 2023-01-17

## 2023-01-17 VITALS
TEMPERATURE: 97 F | SYSTOLIC BLOOD PRESSURE: 136 MMHG | HEART RATE: 73 BPM | OXYGEN SATURATION: 94 % | WEIGHT: 242.51 LBS | RESPIRATION RATE: 18 BRPM | HEIGHT: 72 IN | DIASTOLIC BLOOD PRESSURE: 83 MMHG

## 2023-01-17 VITALS
DIASTOLIC BLOOD PRESSURE: 90 MMHG | OXYGEN SATURATION: 94 % | RESPIRATION RATE: 16 BRPM | SYSTOLIC BLOOD PRESSURE: 134 MMHG | TEMPERATURE: 97 F | HEART RATE: 85 BPM

## 2023-01-17 DIAGNOSIS — G47.33 OBSTRUCTIVE SLEEP APNEA (ADULT) (PEDIATRIC): ICD-10-CM

## 2023-01-17 DIAGNOSIS — Z98.890 OTHER SPECIFIED POSTPROCEDURAL STATES: Chronic | ICD-10-CM

## 2023-01-17 PROCEDURE — 64582 OPN MPLTJ HPGLSL NSTM ARY PG: CPT | Mod: GC

## 2023-01-17 PROCEDURE — 71045 X-RAY EXAM CHEST 1 VIEW: CPT | Mod: 26

## 2023-01-17 DEVICE — INSPIRE RESPIRATORY SENSING LEAD: Type: IMPLANTABLE DEVICE | Status: FUNCTIONAL

## 2023-01-17 DEVICE — INSPIRE IMPLANT PULSE GENERATOR: Type: IMPLANTABLE DEVICE | Status: FUNCTIONAL

## 2023-01-17 DEVICE — INSPIRE STIMULATION LEAD: Type: IMPLANTABLE DEVICE | Status: FUNCTIONAL

## 2023-01-17 DEVICE — INSPIRE PATIENT REMOTE: Type: IMPLANTABLE DEVICE | Status: FUNCTIONAL

## 2023-01-17 RX ORDER — OXYCODONE HYDROCHLORIDE 5 MG/1
5 TABLET ORAL ONCE
Refills: 0 | Status: COMPLETED | OUTPATIENT
Start: 2023-01-17 | End: 2023-01-17

## 2023-01-17 RX ORDER — ADALIMUMAB 40MG/0.8ML
0.8 KIT SUBCUTANEOUS
Qty: 0 | Refills: 0 | DISCHARGE

## 2023-01-17 RX ORDER — ATORVASTATIN CALCIUM 80 MG/1
1 TABLET, FILM COATED ORAL
Qty: 0 | Refills: 0 | DISCHARGE

## 2023-01-17 RX ORDER — ALPRAZOLAM 0.25 MG
1 TABLET ORAL
Qty: 0 | Refills: 0 | DISCHARGE

## 2023-01-17 RX ORDER — OXYCODONE AND ACETAMINOPHEN 5; 325 MG/1; MG/1
1 TABLET ORAL
Qty: 10 | Refills: 0
Start: 2023-01-17 | End: 2023-01-21

## 2023-01-17 RX ORDER — CEPHALEXIN 500 MG
1 CAPSULE ORAL
Qty: 14 | Refills: 0
Start: 2023-01-17 | End: 2023-01-23

## 2023-01-17 RX ORDER — FENTANYL CITRATE 50 UG/ML
25 INJECTION INTRAVENOUS
Refills: 0 | Status: DISCONTINUED | OUTPATIENT
Start: 2023-01-17 | End: 2023-01-17

## 2023-01-17 RX ORDER — FENTANYL CITRATE 50 UG/ML
50 INJECTION INTRAVENOUS
Refills: 0 | Status: DISCONTINUED | OUTPATIENT
Start: 2023-01-17 | End: 2023-01-17

## 2023-01-17 RX ORDER — ONDANSETRON 8 MG/1
4 TABLET, FILM COATED ORAL ONCE
Refills: 0 | Status: DISCONTINUED | OUTPATIENT
Start: 2023-01-17 | End: 2023-01-31

## 2023-01-17 NOTE — ASU PREOP CHECKLIST - RESPIRATORY RATE (BREATHS/MIN)
Problem: Patient Centered Care  Goal: Patient preferences are identified and integrated in the patient's plan of care  Description: Interventions:  - What would you like us to know as we care for you?  From home with family  - Provide timely, complete, and accurate information to patient/family  - Incorporate patient and family knowledge, values, beliefs, and cultural backgrounds into the planning and delivery of care  - Encourage patient/family to participate in care and decision-making at the level they choose  - Honor patient and family perspectives and choices  12/17/2022 1729 by Marques Hall RN  Outcome: Progressing     Problem: Patient/Family Goals  Goal: Patient/Family Long Term Goal  Description: Patient's Long Term Goal: Resolve issues related to diverticulitis     Interventions:  - Fluid therapy  - Pain management  - See additional Care Plan goals for specific interventions  12/17/2022 1729 by Marques Hall RN  Outcome: Progressing    Goal: Patient/Family Short Term Goal  Description: Patient's Short Term Goal: Go home    Interventions:   - Fluid therapy  - Stool softener and laxative treatment  - See additional Care Plan goals for specific interventions  12/17/2022 1729 by Marques Hall RN  Outcome: Progressing       Problem: PAIN - ADULT  Goal: Verbalizes/displays adequate comfort level or patient's stated pain goal  Description: INTERVENTIONS:  - Encourage pt to monitor pain and request assistance  - Assess pain using appropriate pain scale  - Administer analgesics based on type and severity of pain and evaluate response  - Implement non-pharmacological measures as appropriate and evaluate response  - Consider cultural and social influences on pain and pain management  - Manage/alleviate anxiety  - Utilize distraction and/or relaxation techniques  - Monitor for opioid side effects  - Notify MD/LIP if interventions unsuccessful or patient reports new pain  - Anticipate increased pain with activity and pre-medicate as appropriate  12/17/2022 1729 by Pantera Salazar RN  Outcome: Progressing    Problem: GASTROINTESTINAL - ADULT  Goal: Minimal or absence of nausea and vomiting  Description: INTERVENTIONS:  - Maintain adequate hydration with IV or PO as ordered and tolerated  - Nasogastric tube to low intermittent suction as ordered  - Evaluate effectiveness of ordered antiemetic medications  - Provide nonpharmacologic comfort measures as appropriate  - Advance diet as tolerated, if ordered  - Obtain nutritional consult as needed  - Evaluate fluid balance  12/17/2022 1729 by Pantera Salazar RN  Outcome: Progressing    Goal: Maintains or returns to baseline bowel function  Description: INTERVENTIONS:  - Assess bowel function  - Maintain adequate hydration with IV or PO as ordered and tolerated  - Evaluate effectiveness of GI medications  - Encourage mobilization and activity  - Obtain nutritional consult as needed  - Establish a toileting routine/schedule  - Consider collaborating with pharmacy to review patient's medication profile  12/17/2022 1729 by Pantera Salazar RN  Outcome: Progressing    Problem: RISK FOR INFECTION - ADULT  Goal: Absence of fever/infection during anticipated neutropenic period  Description: INTERVENTIONS  - Monitor WBC  - Administer growth factors as ordered  - Implement neutropenic guidelines  12/17/2022 1729 by Pantera Salazar RN  Outcome: Progressing       Problem: DISCHARGE PLANNING  Goal: Discharge to home or other facility with appropriate resources  Description: INTERVENTIONS:  - Identify barriers to discharge w/pt and caregiver  - Include patient/family/discharge partner in discharge planning  - Arrange for needed discharge resources and transportation as appropriate  - Identify discharge learning needs (meds, wound care, etc)  - Arrange for interpreters to assist at discharge as needed  - Consider post-discharge preferences of patient/family/discharge partner  - Complete POLST form as appropriate  - Assess patient's ability to be responsible for managing their own health  - Refer to Case Management Department for coordinating discharge planning if the patient needs post-hospital services based on physician/LIP order or complex needs related to functional status, cognitive ability or social support system  12/17/2022 1729 by Neena Bella RN  Outcome: Progressing 18

## 2023-01-17 NOTE — ASU DISCHARGE PLAN (ADULT/PEDIATRIC) - CALL YOUR DOCTOR IF YOU HAVE ANY OF THE FOLLOWING:
Bleeding that does not stop/Swelling that gets worse/Pain not relieved by Medications Bleeding that does not stop/Swelling that gets worse/Pain not relieved by Medications/Fever greater than (need to indicate Fahrenheit or Celsius)/Wound/Surgical Site with redness, or foul smelling discharge or pus/Nausea and vomiting that does not stop/Unable to urinate/Inability to tolerate liquids or foods/Increased irritability or sluggishness

## 2023-01-17 NOTE — ASU DISCHARGE PLAN (ADULT/PEDIATRIC) - CARE PROVIDER_API CALL
Yobany Perez; LENARD)  Otolaryngology  [address] [place] [id number]  Phone: [phone]  Fax: [phone]  Follow Up Time:

## 2023-01-20 ENCOUNTER — APPOINTMENT (OUTPATIENT)
Dept: OTOLARYNGOLOGY | Facility: CLINIC | Age: 58
End: 2023-01-20

## 2023-01-25 ENCOUNTER — APPOINTMENT (OUTPATIENT)
Dept: OTOLARYNGOLOGY | Facility: CLINIC | Age: 58
End: 2023-01-25
Payer: COMMERCIAL

## 2023-01-25 PROCEDURE — 99024 POSTOP FOLLOW-UP VISIT: CPT

## 2023-01-29 NOTE — PHYSICAL EXAM
[de-identified] : neck and  chest incision clean dry intact no edema no erythema no exudates two dressing removed.

## 2023-01-29 NOTE — HISTORY OF PRESENT ILLNESS
[de-identified] : pt healing well. Pt states there is little  to no pain, denies drainage or swelling at incision. Pt admits to restricting arm for 5 days.\par

## 2023-02-08 PROBLEM — G47.33 OBSTRUCTIVE SLEEP APNEA (ADULT) (PEDIATRIC): Chronic | Status: ACTIVE | Noted: 2023-01-06

## 2023-03-01 ENCOUNTER — APPOINTMENT (OUTPATIENT)
Dept: PULMONOLOGY | Facility: CLINIC | Age: 58
End: 2023-03-01
Payer: COMMERCIAL

## 2023-03-01 PROCEDURE — 99215 OFFICE O/P EST HI 40 MIN: CPT | Mod: 25

## 2023-03-01 PROCEDURE — 95976 ALYS SMPL CN NPGT PRGRMG: CPT

## 2023-03-01 NOTE — PHYSICAL EXAM
[General Appearance - Well Developed] : well developed [General Appearance - Well Nourished] : well nourished [General Appearance - In No Acute Distress] : no acute distress [Normal Conjunctiva] : the conjunctiva exhibited no abnormalities [Eyelids - No Xanthelasma] : the eyelids demonstrated no xanthelasmas [Low Lying Soft Palate] : low lying soft palate [Enlarged Base of the Tongue] : enlargement of the base of the tongue [Heart Rate And Rhythm] : heart rate was normal and rhythm regular [Heart Sounds] : normal S1 and S2 [Respiration, Rhythm And Depth] : normal respiratory rhythm and effort [Exaggerated Use Of Accessory Muscles For Inspiration] : no accessory muscle use [Auscultation Breath Sounds / Voice Sounds] : lungs were clear to auscultation bilaterally [Abnormal Walk] : normal gait [Motor Tone] : muscle strength and tone were normal [Nail Clubbing] : no clubbing of the fingernails [Cyanosis, Localized] : no localized cyanosis [Skin Color & Pigmentation] : normal skin color and pigmentation [Skin Lesions] : no skin lesions [Normal Appearance] : normal appearance [Well Groomed] : well groomed [IV] : IV [Neck Appearance] : the appearance of the neck was normal [] : the neck was supple [Neck Cervical Mass (___cm)] : no neck mass was observed [No Focal Deficits] : no focal deficits [Affect] : the affect was normal [Mood] : the mood was normal [FreeTextEntry1] : full range of tongue motion, no neuropraxia

## 2023-03-01 NOTE — HISTORY OF PRESENT ILLNESS
[FreeTextEntry1] : 56 yo M with moderate HODAN, intolerant of CPAP therapy in the past who presents for follow up visit, activation of Inspire device.\par \par Initial visit 6/9/22: \par Referred by Dr. Perez. \par PMH: Crohn's disease, Ankylosing spondylitis on Humira, chronic Insomnia for which he takes Xanax, HLD\par Diagnosed with HODAN approx 3 years ago, first tried OMAD which he couldn't tolerate and on CPAP therapy since, has used a FFM - very uncomfortable, now using NP. Machine/pressure and mask\par Meds: Xanax, Humira, lipitor\par Tried multiple medications for insomnia - Did not tolerate Ambien - gave him "bad thoughts"\par \par Sleep history: \par Main complaints of nonrestorative sleep, severe snoring and daytime somnolence.\par Bed: 12 am, no difficultly falling asleep, wakes 1-2x to urinate, out of bed at 8 am \par Unrefreshed upon awakening. 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 \par EDS with ESS of 10\par \par \par HST 7/27/22: [place] 28.5/hr, T<90 of 37.9\par \par Follow up OV 3/1/23: \par Date of Inspire implantation: 1/17/23\par \par OV 3/1/2023\par Patient reports he has been doing well since the surgery. Did recently have COVID, although only with mild symptoms. Healing well from the surgery and has no evidence of neuropraxia or dysphagia. Still using CPAP at night, however reports fragmented sleep due to the machine and significant discomfort. He is ready for Inspire activation today.\par \par Quality metrics:\par Tobacco use- never \par ESS \par \par Vaccines: \par COVID: Pfizerx3, booster August 2021\par Influenza: \par Pneumococcal: NA \par

## 2023-03-01 NOTE — CONSULT LETTER
[Dear  ___] : Dear  [unfilled], [Consult Letter:] : I had the pleasure of evaluating your patient, [unfilled]. [( Thank you for referring [unfilled] for consultation for _____ )] : Thank you for referring [unfilled] for consultation for [unfilled] [Please see my note below.] : Please see my note below. [Consult Closing:] : Thank you very much for allowing me to participate in the care of this patient.  If you have any questions, please do not hesitate to contact me. [Sincerely,] : Sincerely, [DrVictoriano  ___] : Dr. ARMENTA [FreeTextEntry2] : Dr. Yobany Perez [FreeTextEntry3] : Brenda Person MD, FAASM\par  of Medicine\par Associate , Fellowship in Pulmonary and Critical Care Medicine\par Division of Pulmonary, Critical Care & Sleep Medicine\par [employer] [facility]\par \par \par

## 2023-03-01 NOTE — ASSESSMENT
[FreeTextEntry1] : 56 yo M with HODAN intolerant of CPAP therapy, who presents for follow up with Inspire. Implanted on 1/17/2023. \par \par Impedance and sensing diagnostics were performed in the office today and were within normal limits. The device appears to be functioning well. His Inspire device was activated at the following settings:\par \par Sensory threshold: 1.3V\par Functional threshold: 1.3V (tongue protrusion)\par \par - Voltage: 1.3V - 2.2V (1.3V)\par - Start delay: 30 mins\par - Pause time: 15 mins \par - Therapy duration: 8 hours\par - Pulse Width: 90 msec\par - Electrode: +-+\par - Rate: 33 Hz\par \par The patient was instructed on how to properly use the device in the office today. He was told to start using the device nightly, and to titrate up on the voltage by 1 level every 2-3 nights. He was counselled on interference of metal detectors and certain phone chargers with the function of the device. He was also counselled on the proper procedure should he require an MRI in the future. Once he reaches a level where he feels his sleep is better controlled, or he reaches the maximum tolerable level he will call the office and we will schedule an HST in order to determine the efficacy of the current settings.\par

## 2023-03-01 NOTE — END OF VISIT
[] : Fellow [Fellow] : Fellow [FreeTextEntry3] : Agree with above. Follow up phone call in 2 weeks to see how he is tolerating.  [FreeTextEntry2] : Inspire device activation [Time Spent: ___ minutes] : I have spent [unfilled] minutes of time on the encounter.

## 2023-03-01 NOTE — REVIEW OF SYSTEMS
[Obesity] : obesity [Difficulty Initiating Sleep] : difficulty falling asleep [Chronic Insomnia] : chronic  insomnia [Negative] : Psychiatric [Difficulty Maintaining Sleep] : no difficulty maintaining sleep [Lower Extremity Discomfort] : no lower extremity discomfort [Unusual Sleep Behavior] : no unusual sleep behavior [FreeTextEntry3] : per hpi [FreeTextEntry8] : per hpi [FreeTextEntry6] : per hpi

## 2023-03-01 NOTE — PROCEDURE
[FreeTextEntry1] : Ultrasound of the tongue was performed in the office today. US examination of the hyoid bone showed good anterior motion on a voltage of 1.5V at an electrode configuration of +-+.\par

## 2023-03-07 ENCOUNTER — NON-APPOINTMENT (OUTPATIENT)
Age: 58
End: 2023-03-07

## 2023-03-23 ENCOUNTER — APPOINTMENT (OUTPATIENT)
Dept: PULMONOLOGY | Facility: CLINIC | Age: 58
End: 2023-03-23
Payer: COMMERCIAL

## 2023-03-23 VITALS
OXYGEN SATURATION: 93 % | DIASTOLIC BLOOD PRESSURE: 99 MMHG | WEIGHT: 240 LBS | HEART RATE: 94 BPM | RESPIRATION RATE: 17 BRPM | HEIGHT: 72 IN | SYSTOLIC BLOOD PRESSURE: 146 MMHG | BODY MASS INDEX: 32.51 KG/M2 | TEMPERATURE: 97.5 F

## 2023-03-23 DIAGNOSIS — R09.81 NASAL CONGESTION: ICD-10-CM

## 2023-03-23 PROCEDURE — 99213 OFFICE O/P EST LOW 20 MIN: CPT

## 2023-03-23 RX ORDER — FLUTICASONE PROPIONATE 50 UG/1
50 SPRAY, METERED NASAL TWICE DAILY
Qty: 1 | Refills: 5 | Status: ACTIVE | COMMUNITY
Start: 2023-03-23 | End: 1900-01-01

## 2023-03-24 NOTE — PHYSICAL EXAM
[General Appearance - Well Developed] : well developed [Normal Appearance] : normal appearance [Well Groomed] : well groomed [General Appearance - Well Nourished] : well nourished [General Appearance - In No Acute Distress] : no acute distress [Normal Conjunctiva] : the conjunctiva exhibited no abnormalities [Eyelids - No Xanthelasma] : the eyelids demonstrated no xanthelasmas [Low Lying Soft Palate] : low lying soft palate [Enlarged Base of the Tongue] : enlargement of the base of the tongue [IV] : IV [Neck Cervical Mass (___cm)] : no neck mass was observed [Heart Rate And Rhythm] : heart rate was normal and rhythm regular [Heart Sounds] : normal S1 and S2 [Respiration, Rhythm And Depth] : normal respiratory rhythm and effort [Exaggerated Use Of Accessory Muscles For Inspiration] : no accessory muscle use [Auscultation Breath Sounds / Voice Sounds] : lungs were clear to auscultation bilaterally [Abnormal Walk] : normal gait [Motor Tone] : muscle strength and tone were normal [Nail Clubbing] : no clubbing of the fingernails [Cyanosis, Localized] : no localized cyanosis [Skin Color & Pigmentation] : normal skin color and pigmentation [Skin Lesions] : no skin lesions [No Focal Deficits] : no focal deficits [Affect] : the affect was normal [Mood] : the mood was normal [Neck Appearance] : the appearance of the neck was normal [] : the neck was supple [FreeTextEntry1] : full range of tongue motion, no neuropraxia

## 2023-03-24 NOTE — REVIEW OF SYSTEMS
[Obesity] : obesity [Negative] : Psychiatric [Difficulty Maintaining Sleep] : no difficulty maintaining sleep [Lower Extremity Discomfort] : no lower extremity discomfort [Unusual Sleep Behavior] : no unusual sleep behavior [FreeTextEntry3] : per hpi [FreeTextEntry8] : per hpi [FreeTextEntry6] : per hpi

## 2023-03-24 NOTE — ASSESSMENT
[FreeTextEntry1] : 56 yo M with HODAN intolerant of CPAP therapy, who presents for follow up with Inspire. Implanted on 1/17/2023. Activated on 3/1/2023. \par \par He has a very bothersome AM dry mouth since starting therapy. Thinks he may be sleeping with his mouth open, however he does not believe that the stimulation from the HGNS is waking him up. He has been advised to start using flonase twice daily for the treatment of nasal congestion to see if this will help him breathe more prominently through his nasal passages instead of his mouth at night. If this is ineffective, he was advised to follow up with Dr. Perez to see if there are any anatomic reasons for nasal blockage.\par \par In the interim, we will perform an HST to determine the efficacy of his current settings. His current settings are as follows:\par \par Sensory threshold: 1.3V\par Functional threshold: 1.3V (tongue protrusion)\par \par - Voltage: 1.3V - 2.2V (2.0V)\par - Start delay: 30 mins\par - Pause time: 15 mins \par - Therapy duration: 8 hours\par - Pulse Width: 90 msec\par - Electrode: +-+\par - Rate: 33 Hz\par

## 2023-03-24 NOTE — HISTORY OF PRESENT ILLNESS
[Obstructive Sleep Apnea] : obstructive sleep apnea [FreeTextEntry1] : 56 yo M with moderate HODAN, intolerant of CPAP therapy in the past who presents for follow up visit, activation of Inspire device.\par \par Initial visit 6/9/22: \par Referred by Dr. Perez. \par PMH: Crohn's disease, Ankylosing spondylitis on Humira, chronic Insomnia for which he takes Xanax, HLD\par Diagnosed with HODAN approx 3 years ago, first tried OMAD which he couldn't tolerate and on CPAP therapy since, has used a FFM - very uncomfortable, now using NP. Machine/pressure and mask\par Meds: Xanax, Humira, lipitor\par Tried multiple medications for insomnia - Did not tolerate Ambien - gave him "bad thoughts"\par \par Sleep history: \par Main complaints of nonrestorative sleep, severe snoring and daytime somnolence.\par Bed: 12 am, no difficultly falling asleep, wakes 1-2x to urinate, out of bed at 8 am \par Unrefreshed upon awakening. 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 \par EDS with ESS of 10\par \par \par HST 7/27/22: [place] 28.5/hr, T<90 of 37.9\par \par Follow up OV 3/1/23: \par Date of Inspire implantation: 1/17/23\par \par OV 3/1/2023\par Patient reports he has been doing well since the surgery. Did recently have COVID, although only with mild symptoms. Healing well from the surgery and has no evidence of neuropraxia or dysphagia. Still using CPAP at night, however reports fragmented sleep due to the machine and significant discomfort. He is ready for Inspire activation today.\par \par Quality metrics:\par Tobacco use- never \par ESS \par \par Vaccines: \par COVID: Pfizerx3, booster August 2021\par Influenza: \par Pneumococcal: NA \par \par OV 3/23/2023\par Patient reports that he has been using Inspire regularly since last visit, and he is up to level 8. Reports a decent quality of sleep until about 3 am, however at that point he is waking up consistently with an excessively dry mouth. He has to drink water and has used OTC sprays to keep his mouth moist, however despite every measure he will often wake up about every 30 minutes for the remainder of the night and wakes up feeling unrefreshed. He does report a history of a mildly deviated septum, but he is not aware of bothersome nasal congestion. He denies awakenings from tongue movement or HGNS stimuli.\par

## 2023-08-10 ENCOUNTER — APPOINTMENT (OUTPATIENT)
Dept: SLEEP CENTER | Facility: CLINIC | Age: 58
End: 2023-08-10

## 2023-09-23 NOTE — H&P PST ADULT - RADIAL PULSE
[address] [place] [address]     Karla Huggins MD right normal/left normal

## 2023-12-16 NOTE — ASU PATIENT PROFILE, ADULT - NSCAFFEAMTFREQ_GEN_ALL_CORE_SD
Patient is stable for discharge at this time, anticipatory guidance provided, close follow-up is encouraged, and ED return instructions have been detailed. Patient is both agreeable to the disposition and plan and discharged home in ambulatory state and in good condition.      Rx education provided, Pt verbalized understanding;      Called to labor and delivery for a scheduled repeat c/s and OB made decision to use vacuum. Mom is 36yo at 39 weeks, , previous C/S , hx wrist fx and surgery 3/2021, PNL neg, GBS unk, Covid Neg, Apos, rupture at delivery. Report by L&D nurse that vacuum delivery was completed and baby was born with spontaneous cry and low tone. I arrived at 6 minutes of life. Baby was on warmer crying, with low tone, pulse ox 84% in room air, tachypnea, mild subcostal retractions and grunting. Continued to dry and stimulate baby, reapplied pulse ox, started CPAP 5 at 25% FiO2, gradually increased to 40% FiO2, by 12 mol pulse ox 93%. Attempted to wean FiO2, resulted in sats decreasing. PE remarkable for decreased isabel and decrease in tone, improving over time. Transferred to NICU with CPAP 5, 35-40% FiO2. D-stick in L&D 64mg/dl. EOS = 0.03, Tmax 36.7C, no abx, ROM at delivery. Called to labor and delivery for a scheduled repeat c/s and OB made decision to use vacuum. Mom is 36yo at 39 weeks, , previous C/S , hx wrist fx and surgery 3/2021, PNL neg, GBS unk, Covid Neg, Apos, rupture at delivery. Report by L&D nurse that vacuum delivery was completed and baby was born with spontaneous cry and low tone. I arrived at 6 minutes of life. Baby was on warmer crying, with low tone, pulse ox 84% in room air, tachypnea, mild subcostal retractions and grunting. Continued to dry and stimulate baby, reapplied pulse ox, started CPAP 5 at 25% FiO2, gradually increased to 40% FiO2, by 12 mol pulse ox 93%. Attempted to wean FiO2, resulted in sats decreasing. PE remarkable for decreased isabel and decrease in tone, improving over time. Transferred to NICU with CPAP 5, 35-40% FiO2. D-stick in L&D 64mg/dl. EOS = 0.03, Tmax 36.7C, no abx, ROM at delivery.    JON CLEMENT; First Name: ______      GA 39 weeks;     Age: 0 d;   PMA: _____    MRN: [id number]  CURRENT STATUS:  Term C/S, vacuum assist, LGA, TTN  INTERVAL EVENTS:  CPAP5, 21%  Weight: 4332   (bw)                               Intake: early  Urine output:  early                                  Stools:  early  Growth:    HC (cm): 39 (08-18)           [08-18]  Length (cm):  52.5; Ta weight %  ____ ; ADWG (g/day)  _____ .  *******************************************************  RESP: Moderate GFR, requiring CPAP5, 21%.   Check CXR, CBG.  CV:  Stable hemodynamics.  Continue CR monitoring.  FEN: NPO for now; consider feeds if respiratory status improves.  HEME: Check T/C, CBC.  Bili ptd.  ID: Monitor for s/s of sepsis and check I/T ratio.  NEURO: Normal exam for age  SOCIAL:   THERMAL: Warmer  MEDS: --  PLANS: As above  Labs:  AM:  barbara    1-2 cups/cans per day

## 2024-01-17 NOTE — ASU DISCHARGE PLAN (ADULT/PEDIATRIC) - FREQUENT HAND WASHING PREVENTS THE SPREAD OF INFECTION.
01/16/24 1521   Bronchoscopy Procedure   Bronchoscopy Procedure Yes   Order placed in Epic? Yes   Start Time 1521   End Time 1613   Performing Physician Dr. Carbajal   Procedure Monitoring Tech Time Bronchoscopy (60 Min)        Statement Selected

## 2024-03-17 ENCOUNTER — TRANSCRIPTION ENCOUNTER (OUTPATIENT)
Age: 59
End: 2024-03-17

## 2024-07-03 NOTE — ASU DISCHARGE PLAN (ADULT/PEDIATRIC) - "IF YOU OR YOUR GUARDIAN/FAMILY IS A SMOKER, IT IS IMPORTANT FOR YOUR HEALTH TO STOP SMOKING. PLEASE BE AWARE THAT SECOND HAND SMOKE IS ALSO HARMFUL."
Attempted to phone patient left detailed message to call the office back  
Patients wife called to report that his BP has been running a little low.  115//60. HR in the 60's. Patient doesn't report any dizziness or any other cardiac complaints. Wife thinks maybe one of the bp pills need to be cut down.    Please advise   
Wife called. Verbalized understanding  
Statement Selected

## 2024-11-04 ENCOUNTER — INPATIENT (INPATIENT)
Facility: HOSPITAL | Age: 59
LOS: 0 days | Discharge: HOME | DRG: 313 | End: 2024-11-05
Attending: GENERAL PRACTICE | Admitting: STUDENT IN AN ORGANIZED HEALTH CARE EDUCATION/TRAINING PROGRAM
Payer: COMMERCIAL

## 2024-11-04 VITALS
HEART RATE: 82 BPM | TEMPERATURE: 98 F | DIASTOLIC BLOOD PRESSURE: 98 MMHG | SYSTOLIC BLOOD PRESSURE: 178 MMHG | WEIGHT: 216.93 LBS | RESPIRATION RATE: 19 BRPM | HEIGHT: 72 IN | OXYGEN SATURATION: 98 %

## 2024-11-04 DIAGNOSIS — Z98.890 OTHER SPECIFIED POSTPROCEDURAL STATES: Chronic | ICD-10-CM

## 2024-11-04 DIAGNOSIS — R07.9 CHEST PAIN, UNSPECIFIED: ICD-10-CM

## 2024-11-04 DIAGNOSIS — I44.1 ATRIOVENTRICULAR BLOCK, SECOND DEGREE: ICD-10-CM

## 2024-11-04 DIAGNOSIS — Z29.9 ENCOUNTER FOR PROPHYLACTIC MEASURES, UNSPECIFIED: ICD-10-CM

## 2024-11-04 DIAGNOSIS — Z96.82 PRESENCE OF NEUROSTIMULATOR: Chronic | ICD-10-CM

## 2024-11-04 DIAGNOSIS — G47.33 OBSTRUCTIVE SLEEP APNEA (ADULT) (PEDIATRIC): ICD-10-CM

## 2024-11-04 DIAGNOSIS — E78.5 HYPERLIPIDEMIA, UNSPECIFIED: ICD-10-CM

## 2024-11-04 DIAGNOSIS — M45.9 ANKYLOSING SPONDYLITIS OF UNSPECIFIED SITES IN SPINE: ICD-10-CM

## 2024-11-04 DIAGNOSIS — E87.8 OTHER DISORDERS OF ELECTROLYTE AND FLUID BALANCE, NOT ELSEWHERE CLASSIFIED: ICD-10-CM

## 2024-11-04 DIAGNOSIS — G47.00 INSOMNIA, UNSPECIFIED: ICD-10-CM

## 2024-11-04 LAB
ALBUMIN SERPL ELPH-MCNC: 3.7 G/DL — SIGNIFICANT CHANGE UP (ref 3.3–5)
ALP SERPL-CCNC: 78 U/L — SIGNIFICANT CHANGE UP (ref 40–120)
ALT FLD-CCNC: 78 U/L — SIGNIFICANT CHANGE UP (ref 12–78)
ANION GAP SERPL CALC-SCNC: 3 MMOL/L — LOW (ref 5–17)
APTT BLD: 29.1 SEC — SIGNIFICANT CHANGE UP (ref 24.5–35.6)
AST SERPL-CCNC: 56 U/L — HIGH (ref 15–37)
BASOPHILS # BLD AUTO: 0.03 K/UL — SIGNIFICANT CHANGE UP (ref 0–0.2)
BASOPHILS NFR BLD AUTO: 0.4 % — SIGNIFICANT CHANGE UP (ref 0–2)
BILIRUB SERPL-MCNC: 0.6 MG/DL — SIGNIFICANT CHANGE UP (ref 0.2–1.2)
BUN SERPL-MCNC: 14 MG/DL — SIGNIFICANT CHANGE UP (ref 7–23)
CALCIUM SERPL-MCNC: 9.8 MG/DL — SIGNIFICANT CHANGE UP (ref 8.5–10.1)
CHLORIDE SERPL-SCNC: 102 MMOL/L — SIGNIFICANT CHANGE UP (ref 96–108)
CK MB BLD-MCNC: <1.1 % — SIGNIFICANT CHANGE UP (ref 0–3.5)
CK MB CFR SERPL CALC: <1 NG/ML — SIGNIFICANT CHANGE UP (ref 0–3.6)
CK SERPL-CCNC: 94 U/L — SIGNIFICANT CHANGE UP (ref 26–308)
CO2 SERPL-SCNC: 31 MMOL/L — SIGNIFICANT CHANGE UP (ref 22–31)
CREAT SERPL-MCNC: 0.97 MG/DL — SIGNIFICANT CHANGE UP (ref 0.5–1.3)
D DIMER BLD IA.RAPID-MCNC: 165 NG/ML DDU — SIGNIFICANT CHANGE UP
EGFR: 90 ML/MIN/1.73M2 — SIGNIFICANT CHANGE UP
EOSINOPHIL # BLD AUTO: 0.07 K/UL — SIGNIFICANT CHANGE UP (ref 0–0.5)
EOSINOPHIL NFR BLD AUTO: 0.8 % — SIGNIFICANT CHANGE UP (ref 0–6)
GLUCOSE SERPL-MCNC: 95 MG/DL — SIGNIFICANT CHANGE UP (ref 70–99)
HCT VFR BLD CALC: 42.7 % — SIGNIFICANT CHANGE UP (ref 39–50)
HGB BLD-MCNC: 14.8 G/DL — SIGNIFICANT CHANGE UP (ref 13–17)
IMM GRANULOCYTES NFR BLD AUTO: 0.2 % — SIGNIFICANT CHANGE UP (ref 0–0.9)
INR BLD: 1.03 RATIO — SIGNIFICANT CHANGE UP (ref 0.85–1.16)
LIDOCAIN IGE QN: 70 U/L — SIGNIFICANT CHANGE UP (ref 13–75)
LYMPHOCYTES # BLD AUTO: 2.04 K/UL — SIGNIFICANT CHANGE UP (ref 1–3.3)
LYMPHOCYTES # BLD AUTO: 24.2 % — SIGNIFICANT CHANGE UP (ref 13–44)
MAGNESIUM SERPL-MCNC: 2.3 MG/DL — SIGNIFICANT CHANGE UP (ref 1.6–2.6)
MCHC RBC-ENTMCNC: 28.7 PG — SIGNIFICANT CHANGE UP (ref 27–34)
MCHC RBC-ENTMCNC: 34.7 G/DL — SIGNIFICANT CHANGE UP (ref 32–36)
MCV RBC AUTO: 82.8 FL — SIGNIFICANT CHANGE UP (ref 80–100)
MONOCYTES # BLD AUTO: 0.72 K/UL — SIGNIFICANT CHANGE UP (ref 0–0.9)
MONOCYTES NFR BLD AUTO: 8.5 % — SIGNIFICANT CHANGE UP (ref 2–14)
NEUTROPHILS # BLD AUTO: 5.55 K/UL — SIGNIFICANT CHANGE UP (ref 1.8–7.4)
NEUTROPHILS NFR BLD AUTO: 65.9 % — SIGNIFICANT CHANGE UP (ref 43–77)
NRBC # BLD: 0 /100 WBCS — SIGNIFICANT CHANGE UP (ref 0–0)
NT-PROBNP SERPL-SCNC: 8 PG/ML — SIGNIFICANT CHANGE UP (ref 0–125)
PLATELET # BLD AUTO: 260 K/UL — SIGNIFICANT CHANGE UP (ref 150–400)
POTASSIUM SERPL-MCNC: 3.4 MMOL/L — LOW (ref 3.5–5.3)
POTASSIUM SERPL-SCNC: 3.4 MMOL/L — LOW (ref 3.5–5.3)
PROT SERPL-MCNC: 8.3 G/DL — SIGNIFICANT CHANGE UP (ref 6–8.3)
PROTHROM AB SERPL-ACNC: 12.1 SEC — SIGNIFICANT CHANGE UP (ref 9.9–13.4)
RBC # BLD: 5.16 M/UL — SIGNIFICANT CHANGE UP (ref 4.2–5.8)
RBC # FLD: 13.2 % — SIGNIFICANT CHANGE UP (ref 10.3–14.5)
SODIUM SERPL-SCNC: 136 MMOL/L — SIGNIFICANT CHANGE UP (ref 135–145)
TROPONIN I, HIGH SENSITIVITY RESULT: 4.6 NG/L — SIGNIFICANT CHANGE UP
WBC # BLD: 8.43 K/UL — SIGNIFICANT CHANGE UP (ref 3.8–10.5)
WBC # FLD AUTO: 8.43 K/UL — SIGNIFICANT CHANGE UP (ref 3.8–10.5)

## 2024-11-04 PROCEDURE — 99285 EMERGENCY DEPT VISIT HI MDM: CPT

## 2024-11-04 PROCEDURE — 93010 ELECTROCARDIOGRAM REPORT: CPT

## 2024-11-04 PROCEDURE — 99223 1ST HOSP IP/OBS HIGH 75: CPT | Mod: GC

## 2024-11-04 PROCEDURE — 71045 X-RAY EXAM CHEST 1 VIEW: CPT | Mod: 26

## 2024-11-04 PROCEDURE — 93010 ELECTROCARDIOGRAM REPORT: CPT | Mod: 77

## 2024-11-04 RX ORDER — ENOXAPARIN SODIUM 40MG/0.4ML
40 SYRINGE (ML) SUBCUTANEOUS EVERY 24 HOURS
Refills: 0 | Status: DISCONTINUED | OUTPATIENT
Start: 2024-11-04 | End: 2024-11-05

## 2024-11-04 RX ORDER — ALPRAZOLAM 0.25 MG
0.5 TABLET ORAL AT BEDTIME
Refills: 0 | Status: DISCONTINUED | OUTPATIENT
Start: 2024-11-04 | End: 2024-11-05

## 2024-11-04 RX ORDER — POTASSIUM CHLORIDE 10 MEQ
40 TABLET, EXTENDED RELEASE ORAL ONCE
Refills: 0 | Status: COMPLETED | OUTPATIENT
Start: 2024-11-04 | End: 2024-11-04

## 2024-11-04 RX ORDER — SODIUM CHLORIDE 9 MG/ML
1000 INJECTION, SOLUTION INTRAMUSCULAR; INTRAVENOUS; SUBCUTANEOUS ONCE
Refills: 0 | Status: COMPLETED | OUTPATIENT
Start: 2024-11-04 | End: 2024-11-04

## 2024-11-04 RX ADMIN — SODIUM CHLORIDE 1000 MILLILITER(S): 9 INJECTION, SOLUTION INTRAMUSCULAR; INTRAVENOUS; SUBCUTANEOUS at 20:26

## 2024-11-04 RX ADMIN — Medication 40 MILLIEQUIVALENT(S): at 21:02

## 2024-11-04 RX ADMIN — SODIUM CHLORIDE 1000 MILLILITER(S): 9 INJECTION, SOLUTION INTRAMUSCULAR; INTRAVENOUS; SUBCUTANEOUS at 19:26

## 2024-11-04 RX ADMIN — Medication 0.5 MILLIGRAM(S): at 23:49

## 2024-11-04 RX ADMIN — Medication 40 MILLIGRAM(S): at 23:49

## 2024-11-04 NOTE — ED PROVIDER NOTE - OBJECTIVE STATEMENT
Patient is a 59-year-old male who presents to the emergency room with a chief complaint of intermittent chest pain.  Past medical history of sleep apnea hyperlipidemia Crohn's ankylosing spondylolysis on Humira on Zepbound for weight loss.  Patient with anxiety.  Reports that initially he had the inspire implant for his sleep apnea but this was not working and so now he uses CPAP.  Does endorse a history of an MI in his brother at the age of 35 and admits to daily alcohol use.  Reports that for the last week he has been experiencing intermittent chest pain.  Initially it was right-sided but now is mostly left-sided.  No significant provoking or relieving episodes although straining his chest does seem to worsen some of the discomfort even though the pain is not necessarily reproducible.  Does endorse no increased pain on ambulation or exertion.  Further notes some intermittent shortness of breath and some mild pleuritic discomfort.  Denies any recent long car rides or trips no history of PE or DVT no calf pain or swelling noted.  Denies any fevers chills nausea vomiting or abdominal pain.  Did take 2 aspirin and half a Xanax prior to arrival

## 2024-11-04 NOTE — H&P ADULT - NSHPREVIEWOFSYSTEMS_GEN_ALL_CORE
CONSTITUTIONAL: denies fever, chills, fatigue, weakness  HEENT: denies blurred vision, sore throat  SKIN: denies new lesions, rash  CARDIOVASCULAR: denies chest pain, chest pressure, palpitations  RESPIRATORY: denies shortness of breath, cough, sputum production  GASTROINTESTINAL: denies nausea, vomiting, diarrhea, abdominal pain, melena or hematochezia  GENITOURINARY: denies dysuria, discharge  NEUROLOGICAL: denies numbness, headache, focal weakness  MUSCULOSKELETAL: denies new joint pain, muscle aches  HEMATOLOGIC: denies gross bleeding, bruising CONSTITUTIONAL: denies fever, chills, fatigue, weakness  HEENT: denies blurred vision, sore throat  SKIN: denies new lesions, rash  CARDIOVASCULAR: + intermittent chest squeezing pain; denies palpitations  RESPIRATORY: denies shortness of breath, cough, sputum production  GASTROINTESTINAL: denies nausea, vomiting, diarrhea, abdominal pain, melena or hematochezia  GENITOURINARY: denies dysuria, discharge  NEUROLOGICAL: denies numbness, headache, focal weakness  MUSCULOSKELETAL: denies new joint pain, muscle aches  HEMATOLOGIC: denies gross bleeding, bruising

## 2024-11-04 NOTE — ED ADULT NURSE NOTE - OBJECTIVE STATEMENT
Received pt in room Received pt in room T4, 59 yr/o male A+OX4, ambulatory at baseline. pt presented to the ED C/O intermittent episodes of chest pain x1 week. pt appears NSR on cardiac monitor. pt staes pain worsens with deep breathing. PERRLA and facial symmetry noted. pt denies numbness, tingling, and weakness in peripheral extremities. VSS, denies SOB, RR even and unlabored. abdomen is flat, soft, nontender; denies nausea, vomiting, diarrhea, and constipation. pt has active and passive ROM of all extremities, no obvious joint deformities noted. skin is clean dry and intact. right AC 20g placed, labs drawn and sent. pending lab results.

## 2024-11-04 NOTE — H&P ADULT - PROBLEM SELECTOR PLAN 4
Humira injection every 2 weeks  - Continue upon discharge Pt uses CPAP machine at home, has machine with him at this time  - Continue with home settings

## 2024-11-04 NOTE — H&P ADULT - NSICDXFAMILYHX_GEN_ALL_CORE_FT
FAMILY HISTORY:  Father  Still living? Unknown  FH: CAD (coronary artery disease), Age at diagnosis: Age Unknown  FH: lung cancer, Age at diagnosis: Age Unknown  FH: prostate cancer, Age at diagnosis: Age Unknown    Mother  Still living? Unknown  FH: type 2 diabetes, Age at diagnosis: Age Unknown    Sibling  Still living? Unknown  FH: heart attack, Age at diagnosis: Age Unknown

## 2024-11-04 NOTE — H&P ADULT - PROBLEM SELECTOR PLAN 5
Continue Xanax QHS for insomnia  - Pt is unable to tolerate melatonin, zolpidem, valium Humira injection every 2 weeks  - Continue upon discharge

## 2024-11-04 NOTE — H&P ADULT - HISTORY OF PRESENT ILLNESS
58 yo M with PMH HODAN on CPAP (has Inspire implant in place, nonfunctional), HLD,  Crohn's ankylosing spondylolysis on Humira, obesity on Zepbound presents to the ED with chest pain.     Denies fever, chills, chest pain, palpitations, SOB, cough, abdominal pain, nausea, vomiting, diarrhea, constipation, urinary frequency, urgency, or dysuria, headaches, changes in vision, dizziness, numbness, tingling.  Denies recent travel, recent antibiotic use, or sick contacts.    ED Course:   Vitals: BP: 178/98, HR: 82, Temp: 98F, RR: 19, SpO2: 98% on RA   Labs:  K 3.4; Dimer NEG  CXR: inspire implant in place R side, otherwise no acute lung processes on personal read, pending official read   EKG: HR 56, second degree mobitz type 1 heart block  Received in the ED: 1L NS Bolus x 1    58 yo M with PMH HODAN on CPAP (has Inspire implant in place, nonfunctional), HLD,  Crohn's ankylosing spondylolysis on Humira every 2 wks, obesity on Zepbound presents to the ED with chest pain. Pt began to have L sided chest pain described as squeezing starting one week prior. No identifiable triggers. Pain was transient, present for several seconds with some exertional movements, described to occur only when pt is changing postures. Denies pain occurring when he exerts himself. Pt believed the pain was worsening and becoming more frequent today (states >10 episodes), therefore presented to the ED. Denies chest pain during exam, denies shortness of breath, nausea, vomiting. Denies any recent travel, sick contacts, tick exposure.     Of note, pt states he had similar episode in 2001, where he was hospitalized overnight for irregular heart rhythm. Pt is unable to further describe the irregular rhythm, has not had any further issues since then. Pt had seen a cardiologist (Dr. Dalton) in 2022, where TTE and stress test was performed, results wnl.     ED Course:   Vitals: BP: 178/98, HR: 82, Temp: 98F, RR: 19, SpO2: 98% on RA   Labs:  K 3.4; Dimer NEG  CXR: inspire implant in place R side, otherwise no acute lung processes on personal read, pending official read   EKG: HR 56, second degree mobitz type 1 heart block  Received in the ED: 1L NS Bolus x 1

## 2024-11-04 NOTE — H&P ADULT - ASSESSMENT
60 yo M with PMH HODAN on CPAP (has Inspire implant in place, nonfunctional), HLD,  Crohn's ankylosing spondylolysis on Humira every 2 wks, obesity on Zepbound presents to the ED with chest pain. Admitted for 2nd degree heart block mobitz type 1, cardiac workup.

## 2024-11-04 NOTE — H&P ADULT - PROBLEM SELECTOR PLAN 1
Pt presenting with intermittent CP for past week; does have distant history of arrhythmia but since then no cardiac issues   - TTE, Stress test in 2022 WNL, per patient  - EKG: HR 56, second degree mobitz type 1 heart block  - Trop, proBNP WNL   - Asymptomatic on eval   - Check TTE   - Monitor on tele  - K>4, Mg >2   - Cardiology (Dr. Patel) consulted, f/u recs Pt presenting with intermittent CP for past week; does have distant history of arrhythmia but since then no cardiac issues   - TTE, Stress test in 2022 WNL, per patient  - EKG: HR 56, second degree mobitz type 1 heart block  - D-Dimer, Trop, proBNP WNL   - Asymptomatic on eval   - Check TTE   - Monitor on tele  - K>4, Mg >2   - Cardiology (Dr. Patel) consulted, f/u recs

## 2024-11-04 NOTE — H&P ADULT - NSHPPHYSICALEXAM_GEN_ALL_CORE
T(C): 36.7 (11-04-24 @ 18:10), Max: 36.7 (11-04-24 @ 18:10)  HR: 70 (11-04-24 @ 19:32) (70 - 82)  BP: 133/84 (11-04-24 @ 19:32) (133/84 - 178/98)  RR: 18 (11-04-24 @ 19:32) (18 - 19)  SpO2: 99% (11-04-24 @ 19:32) (98% - 99%)    CONSTITUTIONAL: Well groomed, no apparent distress  EYES: PERRL and symmetric, EOMI, No conjunctival or scleral injection, non-icteric  ENMT: Oral mucosa with moist membranes. Normal dentition; no pharyngeal injection or exudates  NECK: Supple, symmetric and without tracheal deviation   RESP: No respiratory distress, no use of accessory muscles; CTA b/l, no WRR  CV: RRR, +S1S2, no MRG; no peripheral edema  GI: Soft, NT, ND, no rebound, no guarding; no palpable masses  LYMPH: No cervical LAD or tenderness  MSK: Normal gait; No digital clubbing or cyanosis; examination of the (head/neck/spine/ribs/pelvis, RUE, LUE, RLE, LLE) without misalignment, Normal ROM without pain, no spinal tenderness, normal muscle strength/tone  SKIN: No rashes or ulcers noted; no subcutaneous nodules or induration palpable  NEURO: CN II-XII intact; sensation intact in upper and lower extremities b/l to light touch   PSYCH: Appropriate insight/judgment; A+O x 3, mood and affect appropriate, recent/remote memory intact

## 2024-11-04 NOTE — H&P ADULT - ATTENDING COMMENTS
58 yo M with PMH HODAN on CPAP (has Inspire implant in place, nonfunctional), HLD,  Crohn's ankylosing spondylolysis on Humira every 2 wks, obesity on Zepbound presents to the ED with chest pain. Admitted for 2nd degree heart block mobitz type 1, cardiac workup.       # 2nd degree heart block mobitz type 1  Pt presenting with intermittent CP for past week; does have distant history of arrhythmia but since then no cardiac issues   - TTE, Stress test in 2022 WNL, per patient  - EKG: HR 56, second degree mobitz type 1 heart block  - D-Dimer, Trop, proBNP WNL   - Asymptomatic on eval   - Check TTE   - Monitor on tele  - K>4, Mg >2   - Cardiology (Dr. Patel) consulted, f/u recs    Agree with Resident's note. Refer to resident's note for full plan of chronic comorbidities.

## 2024-11-04 NOTE — ED ADULT NURSE REASSESSMENT NOTE - NS ED NURSE REASSESS COMMENT FT1
Pt received in T4 from ESTRELLA David. Pt resting comfortably in stretcher, maintained on cardiac monitor. IV fluids administered as ordered via R#20g IVL. Pt denies chest pain at rest but rates L sided chest pressure as a 4/10 with exertion. Pt denies sob/palpitations. Pt denies nausea/vomiting/diarrhea/constipation. Pt pending chest XRAY

## 2024-11-04 NOTE — H&P ADULT - PROBLEM SELECTOR PLAN 3
Pt uses CPAP machine at home, has machine with him at this time  - Continue with home settings Continue home Lipitor

## 2024-11-04 NOTE — H&P ADULT - PROBLEM SELECTOR PLAN 6
DVT PPx: Lovenox 40mg QD Continue Xanax QHS for insomnia  - Pt is unable to tolerate melatonin, zolpidem, valium

## 2024-11-04 NOTE — ED PROVIDER NOTE - DIFFERENTIAL DIAGNOSIS
Patient presenting to the emergency room with intermittent chest pain found to be in a second-degree type I heart block.  Will obtain screening labs chest x-ray check D-dimer due to pleuritic symptoms obtain cardiac enzymes EKG consult with cardiology and monitor.  Ultimate clinical disposition will be pending results. Differential Diagnosis

## 2024-11-04 NOTE — ED PROVIDER NOTE - CLINICAL SUMMARY MEDICAL DECISION MAKING FREE TEXT BOX
Patient is a 59-year-old male who presents to the emergency room with a chief complaint of intermittent chest pain.  Past medical history of sleep apnea hyperlipidemia Crohn's ankylosing spondylolysis on Humira on Zepbound for weight loss.  Patient with anxiety.  Reports that initially he had the inspire implant for his sleep apnea but this was not working and so now he uses CPAP.  Does endorse a history of an MI in his brother at the age of 35 and admits to daily alcohol use.  Reports that for the last week he has been experiencing intermittent chest pain.  Initially it was right-sided but now is mostly left-sided.  No significant provoking or relieving episodes although straining his chest does seem to worsen some of the discomfort even though the pain is not necessarily reproducible.  Does endorse no increased pain on ambulation or exertion.  Further notes some intermittent shortness of breath and some mild pleuritic discomfort.  Denies any recent long car rides or trips no history of PE or DVT no calf pain or swelling noted.  Denies any fevers chills nausea vomiting or abdominal pain.  Did take 2 aspirin and half a Xanax prior to arrival. Patient presenting to the emergency room with intermittent chest pain found to be in a second-degree type I heart block.  Will obtain screening labs chest x-ray check D-dimer due to pleuritic symptoms obtain cardiac enzymes EKG consult with cardiology and monitor.  Ultimate clinical disposition will be pending results. Patient is a 59-year-old male who presents to the emergency room with a chief complaint of intermittent chest pain.  Past medical history of sleep apnea hyperlipidemia Crohn's ankylosing spondylolysis on Humira on Zepbound for weight loss.  Patient with anxiety.  Reports that initially he had the inspire implant for his sleep apnea but this was not working and so now he uses CPAP.  Does endorse a history of an MI in his brother at the age of 35 and admits to daily alcohol use.  Reports that for the last week he has been experiencing intermittent chest pain.  Initially it was right-sided but now is mostly left-sided.  No significant provoking or relieving episodes although straining his chest does seem to worsen some of the discomfort even though the pain is not necessarily reproducible.  Does endorse no increased pain on ambulation or exertion.  Further notes some intermittent shortness of breath and some mild pleuritic discomfort.  Denies any recent long car rides or trips no history of PE or DVT no calf pain or swelling noted.  Denies any fevers chills nausea vomiting or abdominal pain.  Did take 2 aspirin and half a Xanax prior to arrival. Patient presenting to the emergency room with intermittent chest pain found to be in a second-degree type I heart block.  Will obtain screening labs chest x-ray check D-dimer due to pleuritic symptoms obtain cardiac enzymes EKG consult with cardiology and monitor.  Ultimate clinical disposition will be pending results. Independent review of initial EKG reveals a normal sinus rhythm with a first-degree AV block at 82 bpm.  Repeat EKG reveals a second-degree type I AV block at 56 bpm.  Results of labs reviewed patient with a normal white count no evidence of anemia coags noted dimer negative CMP noted potassium supplemented troponin within normal.  Independent review of chest x-ray reveals no acute infiltrate.  Cardio consulted believes that patient has a second-degree type I AV block.  Recommending admission to telemetry service will evaluate in the morning patient remained stable.

## 2024-11-05 ENCOUNTER — TRANSCRIPTION ENCOUNTER (OUTPATIENT)
Age: 59
End: 2024-11-05

## 2024-11-05 VITALS
SYSTOLIC BLOOD PRESSURE: 131 MMHG | TEMPERATURE: 98 F | OXYGEN SATURATION: 94 % | HEART RATE: 54 BPM | DIASTOLIC BLOOD PRESSURE: 80 MMHG | RESPIRATION RATE: 18 BRPM

## 2024-11-05 LAB
A1C WITH ESTIMATED AVERAGE GLUCOSE RESULT: 5.3 % — SIGNIFICANT CHANGE UP (ref 4–5.6)
ALBUMIN SERPL ELPH-MCNC: 3.2 G/DL — LOW (ref 3.3–5)
ALP SERPL-CCNC: 67 U/L — SIGNIFICANT CHANGE UP (ref 40–120)
ALT FLD-CCNC: 64 U/L — SIGNIFICANT CHANGE UP (ref 12–78)
ANION GAP SERPL CALC-SCNC: 1 MMOL/L — LOW (ref 5–17)
AST SERPL-CCNC: 37 U/L — SIGNIFICANT CHANGE UP (ref 15–37)
BASOPHILS # BLD AUTO: 0.02 K/UL — SIGNIFICANT CHANGE UP (ref 0–0.2)
BASOPHILS NFR BLD AUTO: 0.3 % — SIGNIFICANT CHANGE UP (ref 0–2)
BILIRUB SERPL-MCNC: 0.8 MG/DL — SIGNIFICANT CHANGE UP (ref 0.2–1.2)
BUN SERPL-MCNC: 14 MG/DL — SIGNIFICANT CHANGE UP (ref 7–23)
CALCIUM SERPL-MCNC: 9.3 MG/DL — SIGNIFICANT CHANGE UP (ref 8.5–10.1)
CHLORIDE SERPL-SCNC: 107 MMOL/L — SIGNIFICANT CHANGE UP (ref 96–108)
CHOLEST SERPL-MCNC: 109 MG/DL — SIGNIFICANT CHANGE UP
CK MB BLD-MCNC: <1.5 % — SIGNIFICANT CHANGE UP (ref 0–3.5)
CK MB BLD-MCNC: <1.5 % — SIGNIFICANT CHANGE UP (ref 0–3.5)
CK MB CFR SERPL CALC: <1 NG/ML — SIGNIFICANT CHANGE UP (ref 0–3.6)
CK MB CFR SERPL CALC: <1 NG/ML — SIGNIFICANT CHANGE UP (ref 0–3.6)
CK SERPL-CCNC: 65 U/L — SIGNIFICANT CHANGE UP (ref 26–308)
CK SERPL-CCNC: 65 U/L — SIGNIFICANT CHANGE UP (ref 26–308)
CO2 SERPL-SCNC: 30 MMOL/L — SIGNIFICANT CHANGE UP (ref 22–31)
CREAT SERPL-MCNC: 0.83 MG/DL — SIGNIFICANT CHANGE UP (ref 0.5–1.3)
EGFR: 101 ML/MIN/1.73M2 — SIGNIFICANT CHANGE UP
EOSINOPHIL # BLD AUTO: 0.1 K/UL — SIGNIFICANT CHANGE UP (ref 0–0.5)
EOSINOPHIL NFR BLD AUTO: 1.4 % — SIGNIFICANT CHANGE UP (ref 0–6)
ESTIMATED AVERAGE GLUCOSE: 105 MG/DL — SIGNIFICANT CHANGE UP (ref 68–114)
GLUCOSE SERPL-MCNC: 77 MG/DL — SIGNIFICANT CHANGE UP (ref 70–99)
HCT VFR BLD CALC: 40.5 % — SIGNIFICANT CHANGE UP (ref 39–50)
HDLC SERPL-MCNC: 42 MG/DL — SIGNIFICANT CHANGE UP
HGB BLD-MCNC: 13.5 G/DL — SIGNIFICANT CHANGE UP (ref 13–17)
IMM GRANULOCYTES NFR BLD AUTO: 0.1 % — SIGNIFICANT CHANGE UP (ref 0–0.9)
LIPID PNL WITH DIRECT LDL SERPL: 53 MG/DL — SIGNIFICANT CHANGE UP
LYMPHOCYTES # BLD AUTO: 2.17 K/UL — SIGNIFICANT CHANGE UP (ref 1–3.3)
LYMPHOCYTES # BLD AUTO: 29.4 % — SIGNIFICANT CHANGE UP (ref 13–44)
MAGNESIUM SERPL-MCNC: 2 MG/DL — SIGNIFICANT CHANGE UP (ref 1.6–2.6)
MCHC RBC-ENTMCNC: 28.2 PG — SIGNIFICANT CHANGE UP (ref 27–34)
MCHC RBC-ENTMCNC: 33.3 G/DL — SIGNIFICANT CHANGE UP (ref 32–36)
MCV RBC AUTO: 84.6 FL — SIGNIFICANT CHANGE UP (ref 80–100)
MONOCYTES # BLD AUTO: 0.9 K/UL — SIGNIFICANT CHANGE UP (ref 0–0.9)
MONOCYTES NFR BLD AUTO: 12.2 % — SIGNIFICANT CHANGE UP (ref 2–14)
NEUTROPHILS # BLD AUTO: 4.19 K/UL — SIGNIFICANT CHANGE UP (ref 1.8–7.4)
NEUTROPHILS NFR BLD AUTO: 56.6 % — SIGNIFICANT CHANGE UP (ref 43–77)
NON HDL CHOLESTEROL: 67 MG/DL — SIGNIFICANT CHANGE UP
NRBC # BLD: 0 /100 WBCS — SIGNIFICANT CHANGE UP (ref 0–0)
PHOSPHATE SERPL-MCNC: 3.5 MG/DL — SIGNIFICANT CHANGE UP (ref 2.5–4.5)
PLATELET # BLD AUTO: 244 K/UL — SIGNIFICANT CHANGE UP (ref 150–400)
POTASSIUM SERPL-MCNC: 4 MMOL/L — SIGNIFICANT CHANGE UP (ref 3.5–5.3)
POTASSIUM SERPL-SCNC: 4 MMOL/L — SIGNIFICANT CHANGE UP (ref 3.5–5.3)
PROT SERPL-MCNC: 7 G/DL — SIGNIFICANT CHANGE UP (ref 6–8.3)
RBC # BLD: 4.79 M/UL — SIGNIFICANT CHANGE UP (ref 4.2–5.8)
RBC # FLD: 13 % — SIGNIFICANT CHANGE UP (ref 10.3–14.5)
SODIUM SERPL-SCNC: 138 MMOL/L — SIGNIFICANT CHANGE UP (ref 135–145)
TRIGL SERPL-MCNC: 65 MG/DL — SIGNIFICANT CHANGE UP
TROPONIN I, HIGH SENSITIVITY RESULT: 5.1 NG/L — SIGNIFICANT CHANGE UP
TROPONIN I, HIGH SENSITIVITY RESULT: 5.5 NG/L — SIGNIFICANT CHANGE UP
WBC # BLD: 7.39 K/UL — SIGNIFICANT CHANGE UP (ref 3.8–10.5)
WBC # FLD AUTO: 7.39 K/UL — SIGNIFICANT CHANGE UP (ref 3.8–10.5)

## 2024-11-05 PROCEDURE — 84100 ASSAY OF PHOSPHORUS: CPT

## 2024-11-05 PROCEDURE — 85379 FIBRIN DEGRADATION QUANT: CPT

## 2024-11-05 PROCEDURE — 82550 ASSAY OF CK (CPK): CPT

## 2024-11-05 PROCEDURE — 93010 ELECTROCARDIOGRAM REPORT: CPT

## 2024-11-05 PROCEDURE — 99285 EMERGENCY DEPT VISIT HI MDM: CPT

## 2024-11-05 PROCEDURE — 85730 THROMBOPLASTIN TIME PARTIAL: CPT

## 2024-11-05 PROCEDURE — 84484 ASSAY OF TROPONIN QUANT: CPT

## 2024-11-05 PROCEDURE — 83880 ASSAY OF NATRIURETIC PEPTIDE: CPT

## 2024-11-05 PROCEDURE — 83690 ASSAY OF LIPASE: CPT

## 2024-11-05 PROCEDURE — 71045 X-RAY EXAM CHEST 1 VIEW: CPT

## 2024-11-05 PROCEDURE — 85610 PROTHROMBIN TIME: CPT

## 2024-11-05 PROCEDURE — 99239 HOSP IP/OBS DSCHRG MGMT >30: CPT

## 2024-11-05 PROCEDURE — 82553 CREATINE MB FRACTION: CPT

## 2024-11-05 PROCEDURE — 99223 1ST HOSP IP/OBS HIGH 75: CPT

## 2024-11-05 PROCEDURE — 85025 COMPLETE CBC W/AUTO DIFF WBC: CPT

## 2024-11-05 PROCEDURE — 83735 ASSAY OF MAGNESIUM: CPT

## 2024-11-05 PROCEDURE — 80053 COMPREHEN METABOLIC PANEL: CPT

## 2024-11-05 PROCEDURE — 96360 HYDRATION IV INFUSION INIT: CPT

## 2024-11-05 PROCEDURE — 83036 HEMOGLOBIN GLYCOSYLATED A1C: CPT

## 2024-11-05 PROCEDURE — 36415 COLL VENOUS BLD VENIPUNCTURE: CPT

## 2024-11-05 PROCEDURE — 93005 ELECTROCARDIOGRAM TRACING: CPT

## 2024-11-05 PROCEDURE — 80061 LIPID PANEL: CPT

## 2024-11-05 RX ORDER — ALPRAZOLAM 0.25 MG
1 TABLET ORAL
Refills: 0 | DISCHARGE

## 2024-11-05 RX ORDER — ADALIMUMAB-AATY 40 MG/.4ML
40 INJECTION SUBCUTANEOUS
Refills: 0 | DISCHARGE

## 2024-11-05 RX ORDER — INFLUENZ VIR VAC TV P-SURF2003 15MCG/.5ML
0.5 SYRINGE (ML) INTRAMUSCULAR ONCE
Refills: 0 | Status: DISCONTINUED | OUTPATIENT
Start: 2024-11-05 | End: 2024-11-05

## 2024-11-05 RX ADMIN — Medication 40 MILLIGRAM(S): at 06:30

## 2024-11-05 NOTE — DISCHARGE NOTE PROVIDER - PROVIDER TOKENS
PROVIDER:[TOKEN:[[id number]],FOLLOWUP:[1 week]],PROVIDER:[TOKEN:[[id number]],FOLLOWUP:[2 weeks]]

## 2024-11-05 NOTE — CONSULT NOTE ADULT - ASSESSMENT
60 yo M with PMH HODAN on CPAP (has Inspire implant in place, nonfunctional), HLD,  Crohn's ankylosing spondylolysis on Humira every 2 wks, obesity on Zepbound presents to the ED with chest pain. Admitted for 2nd degree heart block mobitz type 1. Hypokalemia, now resolved      No indication for EP intervention at this time  outpatient f/u    above as d/w Dr Ge

## 2024-11-05 NOTE — DISCHARGE NOTE PROVIDER - NSDCMRMEDTOKEN_GEN_ALL_CORE_FT
cephalexin 500 mg oral tablet: 1 tab(s) orally 2 times a day   Humira 40 mg/0.8 mL subcutaneous kit: 0.8 milliliter(s) subcutaneous every other week  Humira 40 mg/0.8 mL subcutaneous kit: 40 milligram(s) subcutaneously every 2 weeks  Lipitor 40 mg oral tablet: 1 tab(s) orally once a day (at bedtime)  Lipitor 40 mg oral tablet: 1 tab(s) orally once a day, AM  Medrol Dosepak 4 mg oral tablet: Please give 4mg medrol dosepak to be used as directed  oxycodone-acetaminophen 5 mg-325 mg oral tablet: 1 tab(s) orally 4 times a day, As Needed -for severe pain MDD:4 tablets  Xanax 0.5 mg oral tablet: 1 tab(s) orally once a day, PM  Xanax 0.5 mg oral tablet: 1 tab(s) orally once a day (at bedtime)  Zepbound Pen 15 mg/0.5 mL subcutaneous solution: 15 milligram(s) subcutaneously once a week   Humira 40 mg/0.8 mL subcutaneous kit: 0.8 milliliter(s) subcutaneous every other week  Humira 40 mg/0.8 mL subcutaneous kit: 40 milligram(s) subcutaneously every 2 weeks  Lipitor 40 mg oral tablet: 1 tab(s) orally once a day (at bedtime)  Lipitor 40 mg oral tablet: 1 tab(s) orally once a day, AM  oxycodone-acetaminophen 5 mg-325 mg oral tablet: 1 tab(s) orally 4 times a day, As Needed -for severe pain MDD:4 tablets  Xanax 0.5 mg oral tablet: 1 tab(s) orally once a day, PM  Xanax 0.5 mg oral tablet: 1 tab(s) orally once a day (at bedtime)  Zepbound Pen 15 mg/0.5 mL subcutaneous solution: 15 milligram(s) subcutaneously once a week   Humira 40 mg/0.8 mL subcutaneous kit: 40 milligram(s) subcutaneously every 2 weeks  Lipitor 40 mg oral tablet: 1 tab(s) orally once a day, AM  oxycodone-acetaminophen 5 mg-325 mg oral tablet: 1 tab(s) orally 4 times a day, As Needed -for severe pain MDD:4 tablets  Xanax 0.5 mg oral tablet: 1 tab(s) orally once a day, PM  Zepbound Pen 15 mg/0.5 mL subcutaneous solution: 15 milligram(s) subcutaneously once a week

## 2024-11-05 NOTE — CARE COORDINATION ASSESSMENT. - OTHER PERTINENT DISCHARGE PLANNING INFORMATION:
Met patient at bedside.  Explained role of CM, verbalized understanding. Pt was made aware a CM will remain available through hospitalization.  Contact information given in discharge/ transitions resource folder. Patient lives in a private home with spouse and two adult children. There are 7 [address] and 2 flights to the bedroom. Patient is independent with ADLs and ambulation. Patient has no DME and no history of Homecare/aide services. Patient states his spouse will transport him home. Patient works full time as a business owner. PCP is Dr. Sincere Medina [phone]. No discharge needs identified.

## 2024-11-05 NOTE — CASE MANAGEMENT PROGRESS NOTE - NSCMPROGRESSNOTE_GEN_ALL_CORE
Per MD, patient is medically cleared for discharge home today.  CM met with patient to discuss discharge disposition to home without formal homecare services. Patient endorses that he is independent with disease management, medication management, scheduling all necessary follow up MD appointments, ambulation, and ADLs. Discharge notice signed and copy given to patient. Family to transport patient home. Patient verbalized understanding of the transition plan and is in agreement. CM answered all questions to the best of my abilities. CM remains available throughout hospital stay.

## 2024-11-05 NOTE — DISCHARGE NOTE NURSING/CASE MANAGEMENT/SOCIAL WORK - NSDCPEFALRISK_GEN_ALL_CORE
For information on Fall & Injury Prevention, visit: [email or web address] OR  [email or web address] OR  https://www.cdc.gov/steadi/patient.html

## 2024-11-05 NOTE — DISCHARGE NOTE PROVIDER - HOSPITAL COURSE
FROM ADMISSION H+P:   HPI:  58 yo M with PMH HODAN on CPAP (has Inspire implant in place, nonfunctional), HLD,  Crohn's ankylosing spondylolysis on Humira every 2 wks, obesity on Zepbound presents to the ED with chest pain. Pt began to have L sided chest pain described as squeezing starting one week prior. No identifiable triggers. Pain was transient, present for several seconds with some exertional movements, described to occur only when pt is changing postures. Denies pain occurring when he exerts himself. Pt believed the pain was worsening and becoming more frequent today (states >10 episodes), therefore presented to the ED. Denies chest pain during exam, denies shortness of breath, nausea, vomiting. Denies any recent travel, sick contacts, tick exposure.     Of note, pt states he had similar episode in 2001, where he was hospitalized overnight for irregular heart rhythm. Pt is unable to further describe the irregular rhythm, has not had any further issues since then. Pt had seen a cardiologist (Dr. Dalton) in 2022, where TTE and stress test was performed, results wnl.     ED Course:   Vitals: BP: 178/98, HR: 82, Temp: 98F, RR: 19, SpO2: 98% on RA   Labs:  K 3.4; Dimer NEG  CXR: inspire implant in place R side, otherwise no acute lung processes on personal read, pending official read   EKG: HR 56, second degree mobitz type 1 heart block  Received in the ED: 1L NS Bolus x 1  (04 Nov 2024 20:30)      ---  HOSPITAL COURSE/PERTINENT LABS/PROCEDURES PERFORMED/PENDING TESTS:    ---  PATIENT CONDITION:  - stable    ---  PHYSICAL EXAM ON DAY OF DISCHARGE:    ---  CONSULTANTS:     ---  ADVANCED CARE PLANNING:  - Code status:      - MOLST completed:      [  ] NO     [  ] YES    ---  TIME SPENT:  I, the attending physician, was physically present for the key portions of the evaluation and management (E/M) service provided. The total amount of time spent reviewing the hospital notes, laboratory values, imaging findings, assessing/counseling the patient, discussing with consultant physicians, social work, nursing staff was -- minutes FROM ADMISSION H+P:   HPI:  58 yo M with PMH HODAN on CPAP (has Inspire implant in place, nonfunctional), HLD,  Crohn's ankylosing spondylolysis on Humira every 2 wks, obesity on Zepbound presents to the ED with chest pain. Pt began to have L sided chest pain described as squeezing starting one week prior. No identifiable triggers. Pain was transient, present for several seconds with some exertional movements, described to occur only when pt is changing postures. Denies pain occurring when he exerts himself. Pt believed the pain was worsening and becoming more frequent today (states >10 episodes), therefore presented to the ED. Denies chest pain during exam, denies shortness of breath, nausea, vomiting. Denies any recent travel, sick contacts, tick exposure.     Of note, pt states he had similar episode in 2001, where he was hospitalized overnight for irregular heart rhythm. Pt is unable to further describe the irregular rhythm, has not had any further issues since then. Pt had seen a cardiologist (Dr. Dalton) in 2022, where TTE and stress test was performed, results wnl.     ED Course:   Vitals: BP: 178/98, HR: 82, Temp: 98F, RR: 19, SpO2: 98% on RA   Labs:  K 3.4; Dimer NEG  CXR: inspire implant in place R side, otherwise no acute lung processes on personal read, pending official read   EKG: HR 56, second degree mobitz type 1 heart block  Received in the ED: 1L NS Bolus x 1  (04 Nov 2024 20:30)      ---  HOSPITAL COURSE/PERTINENT LABS/PROCEDURES PERFORMED/PENDING TESTS:  Patient was seen on day of discharge. Reports feeling better. Was evaluated by both EP and cardiology. Plan for outpatient follow up with cardiology for CTA coronaries, holter and TTE. Patient was seen by     ---  PATIENT CONDITION:  - stable    CONSULTANTS:   EP: Dr Willson   Cardio: Dr Ruiz     TIME SPENT:  I, the attending physician, was physically present for the key portions of the evaluation and management (E/M) service provided. The total amount of time spent reviewing the hospital notes, laboratory values, imaging findings, assessing/counseling the patient, discussing with consultant physicians, social work, nursing staff was 40 minutes FROM ADMISSION H+P:   HPI:  58 yo M with PMH HODAN on CPAP (has Inspire implant in place, nonfunctional), HLD,  Crohn's ankylosing spondylolysis on Humira every 2 wks, obesity on Zepbound presents to the ED with chest pain. Pt began to have L sided chest pain described as squeezing starting one week prior. No identifiable triggers. Pain was transient, present for several seconds with some exertional movements, described to occur only when pt is changing postures. Denies pain occurring when he exerts himself. Pt believed the pain was worsening and becoming more frequent today (states >10 episodes), therefore presented to the ED. Denies chest pain during exam, denies shortness of breath, nausea, vomiting. Denies any recent travel, sick contacts, tick exposure.     Of note, pt states he had similar episode in 2001, where he was hospitalized overnight for irregular heart rhythm. Pt is unable to further describe the irregular rhythm, has not had any further issues since then. Pt had seen a cardiologist (Dr. Dalton) in 2022, where TTE and stress test was performed, results wnl.     ED Course:   Vitals: BP: 178/98, HR: 82, Temp: 98F, RR: 19, SpO2: 98% on RA   Labs:  K 3.4; Dimer NEG  CXR: inspire implant in place R side, otherwise no acute lung processes on personal read, pending official read   EKG: HR 56, second degree mobitz type 1 heart block  Received in the ED: 1L NS Bolus x 1  (04 Nov 2024 20:30)      ---  HOSPITAL COURSE/PERTINENT LABS/PROCEDURES PERFORMED/PENDING TESTS:  Patient was seen on day of discharge. Reports feeling better. Was evaluated by both EP and cardiology. Plan for outpatient follow up with cardiology for CTA coronaries, holter and TTE. Patient was seen at bedside. Denies any active chest pain.    ---  PATIENT CONDITION:  - stable    CONSULTANTS:   EP: Dr Willson   Cardio: Dr Ruiz     TIME SPENT:  I, the attending physician, was physically present for the key portions of the evaluation and management (E/M) service provided. The total amount of time spent reviewing the hospital notes, laboratory values, imaging findings, assessing/counseling the patient, discussing with consultant physicians, social work, nursing staff was 40 minutes

## 2024-11-05 NOTE — DISCHARGE NOTE PROVIDER - DISCHARGING ATTENDING PHYSICIAN:
[FreeTextEntry1] : 68 y/o female b/l hip pain and prior T10-S1 PSF DOS:08/02/2021.  She has significant persistent back pain as well as L>R radicular pain.  It is unclear why based on the postoperative imaging.   Patient did not get the EMG due to her not wanting to endure pain. EMG would be helpful and determining physiologic findings of chronic radiculopathy.  I advised the patient to follow up with her pain management doctor to discuss repeat Injections or a possible Peripheral N stim vs DCS - she is a likley candidate. Patient also has bilateral sacroiliitis.  She would need to undergo diagnostic injections now postoperatively to confirm Dx of sacroiliitis and  the response before deciding to proceed with any sacroiliac fusion.  I would like to follow up with the patient in 6-8 weeks. \par \par Prior to appointment and during encounter with patient extensive medical records were reviewed including but not limited to, hospital records, out patient records, imaging results, and lab data. During this appointment the patient was examined, diagnoses were discussed and explained in a face to face manner. In addition extensive time was spent reviewing aforementioned diagnostic studies. Counseling including abnormal image results, differential diagnoses, treatment options, risk and benefits, lifestyle changes, current condition, and current medications was performed. Patient's comments, questions, and concerns were address and patient verbalized understanding. Based on this patient's presentation at our office, which is an orthopedic spine surgeon's office, this patient inherently / intrinsically has a risk, however minute, of developing issues such as Cauda equina syndrome, bowel and bladder changes, or progression of motor or neurological deficits such as paralysis which may be permanent.\par \par I, Crystal Oh, attest that this documentation has been prepared under the direction and in the presence of provider Paul Ritchie MD.   Lavinia Aponte D.O.

## 2024-11-05 NOTE — CONSULT NOTE ADULT - SUBJECTIVE AND OBJECTIVE BOX
[facility]         Jolanta Gaming, Tay, Zohra, Darrius, Álvaro, Amanda        [phone] (office)    Reason for Consult: chest pain, heart block    Interval HPI: Patient seen and examined at bedside. No acute events overnight. Endorses chest pain is sharp, intermittent with and without movement, located in the left parasternal 4-5th rib, and is nonreproducible with palpation. Denies SOB, palpitations, dizziness, headache. Denies any other concerns.     HPI:  60 yo M with PMH HODAN on CPAP (has Inspire implant in place, nonfunctional), HLD,  Crohn's ankylosing spondylolysis on Humira every 2 wks, obesity on Zepbound presents to the ED with chest pain. Pt began to have L sided chest pain described as squeezing starting one week prior. No identifiable triggers. Pain was transient, present for several seconds with some exertional movements, described to occur only when pt is changing postures. Denies pain occurring when he exerts himself. Pt believed the pain was worsening and becoming more frequent today (states >10 episodes), therefore presented to the ED. Denies chest pain during exam, denies shortness of breath, nausea, vomiting. Denies any recent travel, sick contacts, tick exposure.     Of note, pt states he had similar episode in 2001, where he was hospitalized overnight for irregular heart rhythm. Pt is unable to further describe the irregular rhythm, has not had any further issues since then. Pt had seen a cardiologist (Dr. Dalton) in 2022, where TTE and stress test was performed, results wnl.     ED Course:   Vitals: BP: 178/98, HR: 82, Temp: 98F, RR: 19, SpO2: 98% on RA   Labs:  K 3.4; Dimer NEG  CXR: inspire implant in place R side, otherwise no acute lung processes on personal read, pending official read   EKG: HR 56, second degree mobitz type 1 heart block  Received in the ED: 1L NS Bolus x 1  (04 Nov 2024 20:30)      PAST MEDICAL & SURGICAL HISTORY:      SOCIAL HISTORY: No active tobacco, alcohol or illicit drug use    FAMILY HISTORY:      Home Medications:  Humira 40 mg/0.8 mL subcutaneous kit: 0.8 milliliter(s) subcutaneous every other week (17 Jan 2023 07:43)  Humira 40 mg/0.8 mL subcutaneous kit: 40 milligram(s) subcutaneously every 2 weeks (05 Nov 2024 10:17)  Lipitor 40 mg oral tablet: 1 tab(s) orally once a day (at bedtime) (05 Nov 2024 10:17)  Lipitor 40 mg oral tablet: 1 tab(s) orally once a day, AM (17 Jan 2023 07:43)  Xanax 0.5 mg oral tablet: 1 tab(s) orally once a day, PM (17 Jan 2023 07:43)  Xanax 0.5 mg oral tablet: 1 tab(s) orally once a day (at bedtime) (05 Nov 2024 10:17)  Zepbound Pen 15 mg/0.5 mL subcutaneous solution: 15 milligram(s) subcutaneously once a week (05 Nov 2024 10:17)      MEDICATIONS  (STANDING):  ALPRAZolam 0.5 milliGRAM(s) Oral at bedtime  atorvastatin 40 milliGRAM(s) Oral at bedtime  enoxaparin Injectable 40 milliGRAM(s) SubCutaneous every 24 hours  influenza   Vaccine 0.5 milliLiter(s) IntraMuscular once    MEDICATIONS  (PRN):      Allergies    No Known Allergies    Intolerances        REVIEW OF SYSTEMS: Negative except as per HPI.    VITAL SIGNS:   Vital Signs Last 24 Hrs  T(C): 36.5 (05 Nov 2024 05:06), Max: 36.7 (04 Nov 2024 18:10)  T(F): 97.7 (05 Nov 2024 05:06), Max: 98 (04 Nov 2024 18:10)  HR: 54 (05 Nov 2024 05:06) (54 - 82)  BP: 113/72 (05 Nov 2024 05:06) (113/72 - 178/98)  BP(mean): --  RR: 18 (05 Nov 2024 05:06) (17 - 19)  SpO2: 98% (05 Nov 2024 05:06) (95% - 99%)    Parameters below as of 05 Nov 2024 05:06  Patient On (Oxygen Delivery Method): room air        I&O's Summary      PHYSICAL EXAM:  Constitutional: NAD, well-developed  HEENT NC/AT, moist mucous membranes  Pulmonary: Non-labored, breath sounds are clear bilaterally, no wheezing, rales or rhonchi  Cardiovascular: irregular rhythm, regular rate, no murmurs notable on exam   Gastrointestinal: Soft, nontender, nondistended, normoactive bowel sounds  Extremities: No peripheral edema   Neurological: Alert, strength and sensitivity are grossly intact  Skin: No obvious lesions/rashes  Psych: Mood & affect appropriate    LABS: All Labs Reviewed:                        13.5   7.39  )-----------( 244      ( 05 Nov 2024 05:40 )             40.5                         14.8   8.43  )-----------( 260      ( 04 Nov 2024 19:10 )             42.7     05 Nov 2024 05:40    138    |  107    |  14     ----------------------------<  77     4.0     |  30     |  0.83   04 Nov 2024 19:10    136    |  102    |  14     ----------------------------<  95     3.4     |  31     |  0.97     Ca    9.3        05 Nov 2024 05:40  Ca    9.8        04 Nov 2024 19:10  Phos  3.5       05 Nov 2024 05:40  Mg     2.0       05 Nov 2024 05:40  Mg     2.3       04 Nov 2024 19:10    TPro  7.0    /  Alb  3.2    /  TBili  0.8    /  DBili  x      /  AST  37     /  ALT  64     /  AlkPhos  67     05 Nov 2024 05:40  TPro  8.3    /  Alb  3.7    /  TBili  0.6    /  DBili  x      /  AST  56     /  ALT  78     /  AlkPhos  78     04 Nov 2024 19:10    PT/INR - ( 04 Nov 2024 19:10 )   PT: 12.1 sec;   INR: 1.03 ratio         PTT - ( 04 Nov 2024 19:10 )  PTT:29.1 sec  CARDIAC MARKERS ( 04 Nov 2024 19:10 )  x     / x     / x     / x     / <1.0 ng/mL      Blood Culture:     EKG: VR 53BPM, Sinus rhythm with 2nd degree AV block; tele strips show similar EKG pattern       
                                                                      Department of Cardiology                                                                     Division of Electrophysiology                                                               [facility] / [address] [place] [address]                                                                                 [phone]                                                                                                                               Electrophysiology Consult / Pre-Procedure Note    Recent/pertinent 24 hour events:   HPI noted, no significant changes. Pt denies use of AVN blockers  pt with Type 2 AV block 1st degree on tele/12 lead with rates varying from 40-70s  c/o CP are more pleuritic in nature, including worse with inspiration, deep breath and movement  pt denies dizziness, syncope, falls, any other c/o except the intermittent CP. Denies SOB, palpitations, N/V, fever/chills, abd pain, numbness/tingling/weakness, other c/o at this time.  ROS negative x 10 systems except as documented as above.      HPI:  60 yo M with PMH HODAN on CPAP (has Inspire implant in place, nonfunctional), HLD,  Crohn's ankylosing spondylolysis on Humira every 2 wks, obesity on Zepbound presents to the ED with chest pain. Pt began to have L sided chest pain described as squeezing starting one week prior. No identifiable triggers. Pain was transient, present for several seconds with some exertional movements, described to occur only when pt is changing postures. Denies pain occurring when he exerts himself. Pt believed the pain was worsening and becoming more frequent today (states >10 episodes), therefore presented to the ED. Denies chest pain during exam, denies shortness of breath, nausea, vomiting. Denies any recent travel, sick contacts, tick exposure.     Of note, pt states he had similar episode in 2001, where he was hospitalized overnight for irregular heart rhythm. Pt is unable to further describe the irregular rhythm, has not had any further issues since then. Pt had seen a cardiologist (Dr. Dalton) in 2022, where TTE and stress test was performed, results wnl.     ED Course:   Vitals: BP: 178/98, HR: 82, Temp: 98F, RR: 19, SpO2: 98% on RA   Labs:  K 3.4; Dimer NEG  CXR: inspire implant in place R side, otherwise no acute lung processes on personal read, pending official read   EKG: HR 56, second degree mobitz type 1 heart block  Received in the ED: 1L NS Bolus x 1  (04 Nov 2024 20:30)      PAST MEDICAL & SURGICAL HISTORY:  Sleep apnea, moderate on C-pap  Crohn's disease, h/o  on Humira  Ankylosing spondylitis, h/o low back pain years ago,  Hyperlipidemia  2019 novel coronavirus disease (COVID-19),1/2022 with Flu like symptoms 2 days  Iritis of right eye, h/o , last eye drop use 01/22  Umbilical hernia  Insomnia  History of excision of pilonidal cyst, 1992  S/P ACL repair, left-2014  S/P endoscopy, s/p  drug induced endoscopy, tested to find out whether fit for inspire sleep device  S/P placement of nerve stimulator    FAMILY HISTORY:  FH: type 2 diabetes (Mother)  FH: lung cancer (Father)  FH: prostate cancer (Father)  FH: CAD (coronary artery disease) (Father)  FH: heart attack (Sibling)    Social History:  Lives at home with wife, children, pets; independent ADLs, no dietary restrictions (04 Nov 2024 20:30)      MEDICATIONS  (STANDING):  ALPRAZolam 0.5 milliGRAM(s) Oral at bedtime  atorvastatin 40 milliGRAM(s) Oral at bedtime  enoxaparin Injectable 40 milliGRAM(s) SubCutaneous every 24 hours  influenza   Vaccine 0.5 milliLiter(s) IntraMuscular once    No Known Allergies      T(C): 36.5 (11-05-24 @ 05:06), Max: 36.7 (11-04-24 @ 18:10)  HR: 54 (11-05-24 @ 05:06) (54 - 82)  Tele: NSR 1st degree alternating with SR with type 2 1st degree HB  BP: 113/72 (11-05-24 @ 05:06) (113/72 - 178/98)  RR: 18 (11-05-24 @ 05:06) (17 - 19)  SpO2: 98% (11-05-24 @ 05:06) (95% - 99%) on room air      LABS:	 	                        13.5   7.39  )-----------( 244      ( 05 Nov 2024 05:40 )             40.5     11-05    138  |  107  |  14  ----------------------------<  77  4.0   |  30  |  0.83    Ca    9.3      05 Nov 2024 05:40  Phos  3.5     11-05  Mg     2.0     11-05  TPro  7.0  /  Alb  3.2[L]  /  TBili  0.8  /  DBili  x   /  AST  37  /  ALT  64  /  AlkPhos  67  11-05    Troponin I, High Sensitivity Result: 4.6 ng/L (11-04-24 @ 19:10)      ECG   11/4: initial SR with 1st degree (MAXINE 240), no acute or ischemic changes  11/4: repeat wenckebach  11/5: wenckebach, no significant change from prior      Physical Exam:  Constitutional: NAD  Neuro: A+O x 3, non-focal, speech clear and intact  HEENT: NC/AT, PERRL, EOMI, anicteric sclerae, oral mucosa pink and moist  Neck: supple, no JVD  CV: regular rate, irregular rhythm, +S1S2, no murmurs or rub  Pulm/chest: lung sounds CTA and equal bilaterally, no accessory muscle use noted  Abd: soft, NT, ND  Ext: HANSON x 4, no C/C/E  Skin: warm, well perfused  Psych: calm, appropriate affect

## 2024-11-05 NOTE — CARE COORDINATION ASSESSMENT. - NSPASTMEDSURGHISTORY_GEN_ALL_CORE_FT
PAST MEDICAL & SURGICAL HISTORY:  HODAN on CPAP      Insomnia      Crohn disease      HLD (hyperlipidemia)      S/P placement of nerve stimulator

## 2024-11-05 NOTE — CARE COORDINATION ASSESSMENT. - NSCAREPROVIDERS_GEN_ALL_CORE_FT
CARE PROVIDERS:  Accepting Physician: Luis Heard  Administration: Osmani Chairez  Administration: Kike Kearney  Admitting: Luis Heard  Attending: Luis Heard  Cardiology Technician: Hermilo Anne  Case Management: Mary Encarnacion  Consultant: Bekah Ramires  Consultant: Lorie Ruiz  Consultant: Vinny Ge  Covering Team: Steven Bass  Covering Team: Clint James  ED Attending: Christin Canada  ED Nurse: Murtaza Wright  Nurse: Steven Sandra  Nurse: Murtaza Wirght  Nurse: Vinny Reyes  Nurse: Marcela Saenz  Nurse: Shea Monsivais  Ordered: ServiceAccount, [facility] CenterMLM  Override: Matilda De Souza  Primary Team: Bala Kowalski  Primary Team: Selena Coleman  Primary Team: Jhon Welch  Primary Team: Joe Alva  Primary Team: Mel Quiroz  Primary Team: Luis Heard  Registered Dietitian: Lilibeth Menjivar  Respiratory Therapy: Sandro Gardner  : Mindy Alejo  Student: Niecy Prince  Team: [facility], Team

## 2024-11-05 NOTE — PATIENT PROFILE ADULT - NSPROPTRIGHTCAREGIVER_GEN_A_NUR
Continue Rocklatan both eyes at night   Dorzolamide/timolol (Cosopt) 1 drop left eye twice a day   Use over the counter olopatadine 0.2% (Pataday) 1 drop in both eyes daily for allergy  Will schedule visual field 24-2 to evaluate for glaucoma next visit.  
no

## 2024-11-05 NOTE — PROGRESS NOTE ADULT - PROBLEM SELECTOR PLAN 1
Pt presenting with intermittent CP for past week; does have distant history of arrhythmia but since then no cardiac issues   - TTE, Stress test in 2022 WNL, per patient  - EKG: HR 56, second degree mobitz type 1 heart block  - D-Dimer, Trop, proBNP WNL   - Asymptomatic on eval   - Check TTE   - Monitor on tele  - K>4, Mg >2   - Cardiology (Dr. Patel) consulted, f/u recs Pt presenting with intermittent CP for past week; does have distant history of arrhythmia but since then no cardiac issues   - TTE, Stress test in 2022 WNL, per patient  - EKG: HR 56, second degree mobitz type 1 heart block  - D-Dimer, Trop, proBNP WNL   - Asymptomatic on eval   - TTE pending   - Monitor on tele  - K>4, Mg >2   - Cardiology (Dr. Patel) consulted, f/u recs Pt presenting with intermittent CP for past week; does have distant history of arrhythmia but since then no cardiac issues   - TTE, Stress test in 2022 WNL, per patient  - EKG: HR 56, second degree mobitz type 1 heart block; obtain repeat EKG   - D-Dimer, Trop, proBNP WNL; obtain repeat cardiac enzymes   - Asymptomatic on eval   - TTE pending   - Monitor on tele  - K>4, Mg >2   - Cardiology (Dr. Patel) consulted, f/u recs Pt presenting with intermittent CP for past week; does have distant history of arrhythmia but since then no cardiac issues   - TTE, Stress test in 2022 WNL, per patient  - EKG: HR 56, second degree mobitz type 1 heart block;   - D-Dimer, Trop, proBNP WNL;   - Asymptomatic on eval   - TTE pending   - Ordered repeat troponin, cardiac enzymes and EKG  - Monitor on tele  - K>4, Mg >2   - Cardiology (Dr. Patel) consulted, f/u recs

## 2024-11-05 NOTE — DISCHARGE NOTE NURSING/CASE MANAGEMENT/SOCIAL WORK - FINANCIAL ASSISTANCE
[facility] provides services at a reduced cost to those who are determined to be eligible through [facility]’s financial assistance program. Information regarding [facility]’s financial assistance program can be found by going to [email or web address] or by calling [phone].

## 2024-11-05 NOTE — DISCHARGE NOTE NURSING/CASE MANAGEMENT/SOCIAL WORK - PATIENT PORTAL LINK FT
You can access the FollowMyHealth Patient Portal offered by [facility] by registering at the following website: [email or web address]. By joining [facility]’s FollowMyHealth portal, you will also be able to view your health information using other applications (apps) compatible with our system.

## 2024-11-05 NOTE — PROVIDER CONTACT NOTE (OTHER) - ACTION/TREATMENT ORDERED:
Provider made aware with stat EKG ordered and taken.  EKG sent to provider with no additional orders at this time.

## 2024-11-05 NOTE — DISCHARGE NOTE PROVIDER - CARE PROVIDER_API CALL
Lorie Ruiz  Cardiology  [address] [place] [address]  Phone: [phone]  Fax: [phone]  Follow Up Time: 1 week    Vinny Ge  Cardiac Electrophysiology  [address] [place] [id number]  Phone: [phone]  Fax: [phone]  Follow Up Time: 2 weeks

## 2024-11-05 NOTE — CONSULT NOTE ADULT - ASSESSMENT
60 yo M with PMH HODAN on CPAP (has Inspire implant in place, nonfunctional), HLD,  Crohn's ankylosing spondylolysis on Humira every 2 wks, obesity on Zepbound presents to the ED with chest pain.     #Ischemia   - No clear evidence of acute ischemia  - Pain unlikely of cardiac origin   - Troponin negative x 1, CK  94, CK MB <1.0  - Ordered another set of cardiac enzymes   - EKG: VR 53BPM, Sinus rhythm with 2nd degree AV block; tele strips show similar EKG pattern   - Continue to monitor for signs or symptoms of ischemia     #Arrhythmia  - EKG: VR 53BPM, Sinus rhythm with 2nd degree AV block; tele strips show similar EKG pattern   - Pain unlikely of cardiac origin   - Will need outpatient Holter monitor   - Monitor and replete lytes, keep K>4, Mg>2.    - Other cardiovascular workup will depend on clinical course.  - All other workup per primary team.  - Will continue to follow.       58 yo M with PMH HODAN on CPAP (has Inspire implant in place, nonfunctional), HLD, Crohn's ankylosing spondylolysis on Humira every 2 wks, obesity on Zepbound presents to the ED with chest pain.     #Ischemia   - No clear evidence of acute ischemia  - Pain unlikely of cardiac origin   - Troponin negative x 2, CK  94, CK MB <1.0  - Ordered another set of cardiac enzymes   - EKG: VR 53BPM, Sinus rhythm with 2nd degree AV block type 1, tele strips show similar EKG pattern   - Continue to monitor for signs or symptoms of ischemia     #Arrhythmia  - EKG: VR 53BPM, Sinus rhythm with 2nd degree AV block; tele strips show similar EKG pattern   - Pain unlikely of cardiac origin   - Will need outpatient Holter monitor   - Monitor and replete lytes, keep K>4, Mg>2.    - Other cardiovascular workup will depend on clinical course.  - All other workup per primary team.  - Will continue to follow.

## 2024-11-05 NOTE — PROGRESS NOTE ADULT - PROBLEM SELECTOR PLAN 2
#K 3.4 on admit  - Repleted in ED, f/u AM CMP  - Monitor daily K 3.4 on admit  - Repleted in ED, K today 4   - Monitor daily

## 2024-11-05 NOTE — PROGRESS NOTE ADULT - SUBJECTIVE AND OBJECTIVE BOX
Patient is a 59y old  Male who presents with a chief complaint of chest pain, heart block (04 Nov 2024 20:30)      Subjective:  INTERVAL HPI/OVERNIGHT EVENTS: Patient seen and examined at bedside. No overnight events occurred.     MEDICATIONS  (STANDING):  ALPRAZolam 0.5 milliGRAM(s) Oral at bedtime  atorvastatin 40 milliGRAM(s) Oral at bedtime  enoxaparin Injectable 40 milliGRAM(s) SubCutaneous every 24 hours  influenza   Vaccine 0.5 milliLiter(s) IntraMuscular once    MEDICATIONS  (PRN):      Allergies    No Known Allergies    Intolerances        REVIEW OF SYSTEMS:  CONSTITUTIONAL: No fever or chills  HEENT:  No headache, no sore throat  RESPIRATORY: No cough, wheezing, or shortness of breath  CARDIOVASCULAR: No chest pain, palpitations  GASTROINTESTINAL: No abd pain, nausea, vomiting, or diarrhea  GENITOURINARY: No dysuria, frequency, or hematuria  NEUROLOGICAL: no focal weakness or dizziness  MUSCULOSKELETAL: no myalgias     Objective:  Vital Signs Last 24 Hrs  T(C): 36.5 (05 Nov 2024 05:06), Max: 36.7 (04 Nov 2024 18:10)  T(F): 97.7 (05 Nov 2024 05:06), Max: 98 (04 Nov 2024 18:10)  HR: 54 (05 Nov 2024 05:06) (54 - 82)  BP: 113/72 (05 Nov 2024 05:06) (113/72 - 178/98)  RR: 18 (05 Nov 2024 05:06) (17 - 19)  SpO2: 98% (05 Nov 2024 05:06) (95% - 99%)    Parameters below as of 05 Nov 2024 05:06  Patient On (Oxygen Delivery Method): room air        GENERAL: NAD, lying in bed comfortably  HEAD:  Atraumatic, Normocephalic  EYES: EOMI, PERRLA, conjunctiva and sclera clear  ENT: Moist mucous membranes  NECK: Supple, No JVD  CHEST/LUNG: Clear to auscultation bilaterally; No rales, rhonchi, wheezing, or rubs. Unlabored respirations  HEART: Regular rate and rhythm; No murmurs, rubs, or gallops  ABDOMEN: Bowel sounds present; Soft, Nontender, Nondistended. No hepatomegaly  EXTREMITIES:  2+ Peripheral Pulses, brisk capillary refill. No clubbing, cyanosis, or edema  NERVOUS SYSTEM:  Alert & Oriented X3, speech clear. No deficits   MSK: FROM all 4 extremities, full and equal strength  SKIN: No rashes or lesions    LABS:                        13.5   7.39  )-----------( 244      ( 05 Nov 2024 05:40 )             40.5     CBC Full  -  ( 05 Nov 2024 05:40 )  WBC Count : 7.39 K/uL  Hemoglobin : 13.5 g/dL  Hematocrit : 40.5 %  Platelet Count - Automated : 244 K/uL  Mean Cell Volume : 84.6 fl  Mean Cell Hemoglobin : 28.2 pg  Mean Cell Hemoglobin Concentration : 33.3 g/dL  Auto Neutrophil # : 4.19 K/uL  Auto Lymphocyte # : 2.17 K/uL  Auto Monocyte # : 0.90 K/uL  Auto Eosinophil # : 0.10 K/uL  Auto Basophil # : 0.02 K/uL  Auto Neutrophil % : 56.6 %  Auto Lymphocyte % : 29.4 %  Auto Monocyte % : 12.2 %  Auto Eosinophil % : 1.4 %  Auto Basophil % : 0.3 %    04 Nov 2024 19:10    136    |  102    |  14     ----------------------------<  95     3.4     |  31     |  0.97     Ca    9.8        04 Nov 2024 19:10  Mg     2.3       04 Nov 2024 19:10    TPro  8.3    /  Alb  3.7    /  TBili  0.6    /  DBili  x      /  AST  56     /  ALT  78     /  AlkPhos  78     04 Nov 2024 19:10    PT/INR - ( 04 Nov 2024 19:10 )   PT: 12.1 sec;   INR: 1.03 ratio         PTT - ( 04 Nov 2024 19:10 )  PTT:29.1 sec  Urinalysis Basic - ( 04 Nov 2024 19:10 )    Color: x / Appearance: x / SG: x / pH: x  Gluc: 95 mg/dL / Ketone: x  / Bili: x / Urobili: x   Blood: x / Protein: x / Nitrite: x   Leuk Esterase: x / RBC: x / WBC x   Sq Epi: x / Non Sq Epi: x / Bacteria: x      CAPILLARY BLOOD GLUCOSE              RADIOLOGY & ADDITIONAL TESTS:    Personally reviewed.     Consultant(s) Notes Reviewed:  [x] YES  [ ] NO     Patient is a 59y old  Male who presents with a chief complaint of chest pain, heart block (04 Nov 2024 20:30)      Subjective:  INTERVAL HPI/OVERNIGHT EVENTS: Patient seen and examined at bedside. No overnight events occurred. Patient reports continued left sided chest tightness with movement that resolves with rest. Patient reports that his chest pain began one week ago, came on with movement, and he came to the ED after these episodes increased in frequency. Patient denies palpitations, SOB, dizziness, or radiation of the chest pain. Patient has no other complaints at this time.     MEDICATIONS  (STANDING):  ALPRAZolam 0.5 milliGRAM(s) Oral at bedtime  atorvastatin 40 milliGRAM(s) Oral at bedtime  enoxaparin Injectable 40 milliGRAM(s) SubCutaneous every 24 hours  influenza   Vaccine 0.5 milliLiter(s) IntraMuscular once    MEDICATIONS  (PRN):      Allergies    No Known Allergies    Intolerances        REVIEW OF SYSTEMS:  CONSTITUTIONAL: No fever or chills  HEENT:  No headache, no sore throat  RESPIRATORY: No cough, wheezing, or shortness of breath  CARDIOVASCULAR: No chest pain, palpitations  GASTROINTESTINAL: No abd pain, nausea, vomiting, or diarrhea  GENITOURINARY: No dysuria, frequency, or hematuria  NEUROLOGICAL: no focal weakness or dizziness  MUSCULOSKELETAL: no myalgias     Objective:  Vital Signs Last 24 Hrs  T(C): 36.5 (05 Nov 2024 05:06), Max: 36.7 (04 Nov 2024 18:10)  T(F): 97.7 (05 Nov 2024 05:06), Max: 98 (04 Nov 2024 18:10)  HR: 54 (05 Nov 2024 05:06) (54 - 82)  BP: 113/72 (05 Nov 2024 05:06) (113/72 - 178/98)  RR: 18 (05 Nov 2024 05:06) (17 - 19)  SpO2: 98% (05 Nov 2024 05:06) (95% - 99%)    Parameters below as of 05 Nov 2024 05:06  Patient On (Oxygen Delivery Method): room air        GENERAL: NAD, lying in bed comfortably  HEAD:  Atraumatic, Normocephalic  EYES: EOMI, PERRLA, conjunctiva and sclera clear  ENT: Moist mucous membranes  NECK: Supple, No JVD  CHEST/LUNG: Clear to auscultation bilaterally; No rales, rhonchi, wheezing, or rubs. Unlabored respirations  HEART: Regular rate and rhythm; No murmurs, rubs, or gallops  ABDOMEN: Bowel sounds present; Soft, Nontender, Nondistended. No hepatomegaly  EXTREMITIES:  2+ Peripheral Pulses, brisk capillary refill. No clubbing, cyanosis, or edema  NERVOUS SYSTEM:  Alert & Oriented X3, speech clear. No deficits   MSK: FROM all 4 extremities, full and equal strength  SKIN: No rashes or lesions    LABS:                        13.5   7.39  )-----------( 244      ( 05 Nov 2024 05:40 )             40.5     CBC Full  -  ( 05 Nov 2024 05:40 )  WBC Count : 7.39 K/uL  Hemoglobin : 13.5 g/dL  Hematocrit : 40.5 %  Platelet Count - Automated : 244 K/uL  Mean Cell Volume : 84.6 fl  Mean Cell Hemoglobin : 28.2 pg  Mean Cell Hemoglobin Concentration : 33.3 g/dL  Auto Neutrophil # : 4.19 K/uL  Auto Lymphocyte # : 2.17 K/uL  Auto Monocyte # : 0.90 K/uL  Auto Eosinophil # : 0.10 K/uL  Auto Basophil # : 0.02 K/uL  Auto Neutrophil % : 56.6 %  Auto Lymphocyte % : 29.4 %  Auto Monocyte % : 12.2 %  Auto Eosinophil % : 1.4 %  Auto Basophil % : 0.3 %      11-05    138  |  107  |  14  ----------------------------<  77  4.0   |  30  |  0.83    Ca    9.3      05 Nov 2024 05:40  Phos  3.5     11-05  Mg     2.0     11-05    PT/INR - ( 04 Nov 2024 19:10 )   PT: 12.1 sec;   INR: 1.03 ratio         PTT - ( 04 Nov 2024 19:10 )  PTT:29.1 sec    TPro  7.0  /  Alb  3.2[L]  /  TBili  0.8  /  DBili  x   /  AST  37  /  ALT  64  /  AlkPhos  67  11-05      Urinalysis Basic - ( 05 Nov 2024 05:40 )    Color: x / Appearance: x / SG: x / pH: x  Gluc: 77 mg/dL / Ketone: x  / Bili: x / Urobili: x   Blood: x / Protein: x / Nitrite: x   Leuk Esterase: x / RBC: x / WBC x   Sq Epi: x / Non Sq Epi: x / Bacteria: x        CAPILLARY BLOOD GLUCOSE    RADIOLOGY & ADDITIONAL TESTS:      Personally reviewed.     Consultant(s) Notes Reviewed:  [x] YES  [ ] NO

## 2024-11-05 NOTE — CASE MANAGEMENT PROGRESS NOTE - NSCMPROGRESSNOTE_GEN_ALL_CORE
Patient discussed during interdisciplinary rounds. Patient remains acute with first degree heart block. Patient pending cardiology consult and echo. Patient is independent with ADLs, ambulation, and scheduling follow up MD appointments. No discharge needs identified at this time. CM will remain available. Patient discussed during interdisciplinary rounds. Patient remains acute with second degree heart block. Patient pending cardiology consult and echo. Patient is independent with ADLs, ambulation, and scheduling follow up MD appointments. No discharge needs identified at this time. CM will remain available. Patient discussed during interdisciplinary rounds. Patient remains acute with AV heart block. Patient pending cardiology consult and echo. Patient is independent with ADLs, ambulation, and scheduling follow up MD appointments. No discharge needs identified at this time. CM will remain available.

## 2024-11-05 NOTE — CONSULT NOTE ADULT - ATTENDING COMMENTS
58 yo M with PMH HODAN on CPAP (has Inspire implant in place, nonfunctional), HLD, Crohn's ankylosing spondylolysis on Humira every 2 wks, obesity on Zepbound presents to the ED with chest pain.     Presenting with intermittent chest discomfort  Trops negative x 2  EKG with Sinus bradycardia  Tele and further EKG rhythm strips reviewed, show 2nd degree heart block type 1  Will need outpatient CCTA, holter and TTE.   Will follow up closely with me.

## 2024-11-05 NOTE — DISCHARGE NOTE PROVIDER - NSDCCPCAREPLAN_GEN_ALL_CORE_FT
PRINCIPAL DISCHARGE DIAGNOSIS  Diagnosis: Nonspecific chest pain  Assessment and Plan of Treatment: Your heart enzymes were stable during hospitalization.   You were seen by cardiology and was recommended outpatient follow up for CT coronaries, holter monitoring and Echocardiogram.   Follow up with your cardiologist in the office in 1-2 weeks.      SECONDARY DISCHARGE DIAGNOSES  Diagnosis: Mobitz type 1 second degree AV block  Assessment and Plan of Treatment: Please follow up outpatient with Dr Willson. No need for any intervention at this time.

## 2024-11-06 ENCOUNTER — NON-APPOINTMENT (OUTPATIENT)
Age: 59
End: 2024-11-06

## 2024-11-06 NOTE — ASU DISCHARGE PLAN (ADULT/PEDIATRIC) - ***IN THE EVENT THAT YOU DEVELOP A COMPLICATION AND YOU ARE UNABLE TO REACH YOUR OWN PHYSICIAN, YOU MAY CONTACT:
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
Returned patient's call. Informed her that work excuse has been sent to her in [place]. She v/u and agreed.   
Statement Selected

## 2024-11-07 ENCOUNTER — NON-APPOINTMENT (OUTPATIENT)
Age: 59
End: 2024-11-07

## 2024-11-07 ENCOUNTER — APPOINTMENT (OUTPATIENT)
Dept: CARDIOLOGY | Facility: CLINIC | Age: 59
End: 2024-11-07
Payer: COMMERCIAL

## 2024-11-07 VITALS
WEIGHT: 220 LBS | BODY MASS INDEX: 29.8 KG/M2 | OXYGEN SATURATION: 99 % | SYSTOLIC BLOOD PRESSURE: 123 MMHG | HEIGHT: 72 IN | DIASTOLIC BLOOD PRESSURE: 81 MMHG | HEART RATE: 88 BPM

## 2024-11-07 DIAGNOSIS — E78.2 MIXED HYPERLIPIDEMIA: ICD-10-CM

## 2024-11-07 DIAGNOSIS — I44.1 ATRIOVENTRICULAR BLOCK, SECOND DEGREE: ICD-10-CM

## 2024-11-07 DIAGNOSIS — R07.9 CHEST PAIN, UNSPECIFIED: ICD-10-CM

## 2024-11-07 PROBLEM — G47.00 INSOMNIA, UNSPECIFIED: Chronic | Status: ACTIVE | Noted: 2024-11-04

## 2024-11-07 PROBLEM — K50.90 CROHN'S DISEASE, UNSPECIFIED, WITHOUT COMPLICATIONS: Chronic | Status: ACTIVE | Noted: 2024-11-04

## 2024-11-07 PROBLEM — G47.33 OBSTRUCTIVE SLEEP APNEA (ADULT) (PEDIATRIC): Chronic | Status: ACTIVE | Noted: 2024-11-04

## 2024-11-07 PROBLEM — E78.5 HYPERLIPIDEMIA, UNSPECIFIED: Chronic | Status: ACTIVE | Noted: 2024-11-04

## 2024-11-07 PROCEDURE — G2211 COMPLEX E/M VISIT ADD ON: CPT | Mod: NC

## 2024-11-07 PROCEDURE — 93000 ELECTROCARDIOGRAM COMPLETE: CPT

## 2024-11-07 PROCEDURE — 99215 OFFICE O/P EST HI 40 MIN: CPT

## 2024-12-10 ENCOUNTER — APPOINTMENT (OUTPATIENT)
Dept: CARDIOLOGY | Facility: CLINIC | Age: 59
End: 2024-12-10
Payer: COMMERCIAL

## 2024-12-10 PROCEDURE — 93306 TTE W/DOPPLER COMPLETE: CPT

## 2024-12-10 PROCEDURE — 93015 CV STRESS TEST SUPVJ I&R: CPT

## 2024-12-12 ENCOUNTER — APPOINTMENT (OUTPATIENT)
Facility: CLINIC | Age: 59
End: 2024-12-12
Payer: COMMERCIAL

## 2024-12-12 ENCOUNTER — NON-APPOINTMENT (OUTPATIENT)
Age: 59
End: 2024-12-12

## 2024-12-12 VITALS
OXYGEN SATURATION: 99 % | WEIGHT: 213 LBS | DIASTOLIC BLOOD PRESSURE: 74 MMHG | BODY MASS INDEX: 28.85 KG/M2 | HEART RATE: 54 BPM | SYSTOLIC BLOOD PRESSURE: 122 MMHG | HEIGHT: 72 IN

## 2024-12-12 DIAGNOSIS — R94.31 ABNORMAL ELECTROCARDIOGRAM [ECG] [EKG]: ICD-10-CM

## 2024-12-12 DIAGNOSIS — E66.811 OBESITY, CLASS 1: ICD-10-CM

## 2024-12-12 DIAGNOSIS — I44.1 ATRIOVENTRICULAR BLOCK, SECOND DEGREE: ICD-10-CM

## 2024-12-12 PROCEDURE — 99203 OFFICE O/P NEW LOW 30 MIN: CPT | Mod: 25

## 2024-12-12 PROCEDURE — 93000 ELECTROCARDIOGRAM COMPLETE: CPT

## 2024-12-12 RX ORDER — TIRZEPATIDE 15 MG/.5ML
15 INJECTION, SOLUTION SUBCUTANEOUS
Refills: 0 | Status: ACTIVE | COMMUNITY

## 2024-12-13 PROBLEM — E66.811 OBESITY, CLASS I, BMI 30-34.9: Status: ACTIVE | Noted: 2022-06-09

## 2024-12-13 PROBLEM — R94.31 ABNORMAL ECG: Status: ACTIVE | Noted: 2024-12-12

## 2025-02-10 ENCOUNTER — NON-APPOINTMENT (OUTPATIENT)
Age: 60
End: 2025-02-10

## 2025-02-10 ENCOUNTER — APPOINTMENT (OUTPATIENT)
Dept: CARDIOLOGY | Facility: CLINIC | Age: 60
End: 2025-02-10
Payer: COMMERCIAL

## 2025-02-10 VITALS
SYSTOLIC BLOOD PRESSURE: 120 MMHG | OXYGEN SATURATION: 96 % | HEIGHT: 72 IN | HEART RATE: 39 BPM | DIASTOLIC BLOOD PRESSURE: 69 MMHG | WEIGHT: 218 LBS | BODY MASS INDEX: 29.53 KG/M2

## 2025-02-10 DIAGNOSIS — R94.31 ABNORMAL ELECTROCARDIOGRAM [ECG] [EKG]: ICD-10-CM

## 2025-02-10 DIAGNOSIS — E78.2 MIXED HYPERLIPIDEMIA: ICD-10-CM

## 2025-02-10 DIAGNOSIS — I44.1 ATRIOVENTRICULAR BLOCK, SECOND DEGREE: ICD-10-CM

## 2025-02-10 PROCEDURE — G2211 COMPLEX E/M VISIT ADD ON: CPT | Mod: NC

## 2025-02-10 PROCEDURE — 99214 OFFICE O/P EST MOD 30 MIN: CPT

## 2025-02-10 PROCEDURE — 93000 ELECTROCARDIOGRAM COMPLETE: CPT

## 2025-02-12 ENCOUNTER — NON-APPOINTMENT (OUTPATIENT)
Age: 60
End: 2025-02-12

## 2025-04-10 ENCOUNTER — RESULT REVIEW (OUTPATIENT)
Age: 60
End: 2025-04-10

## 2025-06-05 ENCOUNTER — TRANSCRIPTION ENCOUNTER (OUTPATIENT)
Age: 60
End: 2025-06-05

## 2025-06-12 ENCOUNTER — APPOINTMENT (OUTPATIENT)
Dept: CARDIOLOGY | Facility: CLINIC | Age: 60
End: 2025-06-12
Payer: COMMERCIAL

## 2025-06-12 VITALS
OXYGEN SATURATION: 96 % | HEIGHT: 72 IN | WEIGHT: 220 LBS | SYSTOLIC BLOOD PRESSURE: 136 MMHG | HEART RATE: 42 BPM | DIASTOLIC BLOOD PRESSURE: 77 MMHG | BODY MASS INDEX: 29.8 KG/M2

## 2025-06-12 PROCEDURE — 99214 OFFICE O/P EST MOD 30 MIN: CPT

## 2025-06-12 PROCEDURE — G2211 COMPLEX E/M VISIT ADD ON: CPT | Mod: NC

## 2025-06-12 PROCEDURE — 93000 ELECTROCARDIOGRAM COMPLETE: CPT

## 2025-07-24 ENCOUNTER — NON-APPOINTMENT (OUTPATIENT)
Age: 60
End: 2025-07-24

## 2025-07-24 ENCOUNTER — APPOINTMENT (OUTPATIENT)
Facility: CLINIC | Age: 60
End: 2025-07-24
Payer: COMMERCIAL

## 2025-07-24 VITALS
DIASTOLIC BLOOD PRESSURE: 76 MMHG | HEIGHT: 72 IN | SYSTOLIC BLOOD PRESSURE: 119 MMHG | BODY MASS INDEX: 28.99 KG/M2 | HEART RATE: 77 BPM | WEIGHT: 214 LBS | OXYGEN SATURATION: 98 %

## 2025-07-24 DIAGNOSIS — I44.1 ATRIOVENTRICULAR BLOCK, SECOND DEGREE: ICD-10-CM

## 2025-07-24 PROCEDURE — 99214 OFFICE O/P EST MOD 30 MIN: CPT | Mod: 25

## 2025-07-24 PROCEDURE — 93000 ELECTROCARDIOGRAM COMPLETE: CPT

## 2025-08-05 ENCOUNTER — TRANSCRIPTION ENCOUNTER (OUTPATIENT)
Age: 60
End: 2025-08-05

## 2025-09-09 ENCOUNTER — TRANSCRIPTION ENCOUNTER (OUTPATIENT)
Age: 60
End: 2025-09-09

## 2025-09-16 ENCOUNTER — APPOINTMENT (OUTPATIENT)
Dept: CARDIOLOGY | Facility: CLINIC | Age: 60
End: 2025-09-16
Payer: COMMERCIAL

## 2025-09-16 VITALS
WEIGHT: 226 LBS | OXYGEN SATURATION: 97 % | DIASTOLIC BLOOD PRESSURE: 87 MMHG | SYSTOLIC BLOOD PRESSURE: 138 MMHG | HEIGHT: 72 IN | HEART RATE: 86 BPM | BODY MASS INDEX: 30.61 KG/M2

## 2025-09-16 DIAGNOSIS — I44.1 ATRIOVENTRICULAR BLOCK, SECOND DEGREE: ICD-10-CM

## 2025-09-16 DIAGNOSIS — E78.2 MIXED HYPERLIPIDEMIA: ICD-10-CM

## 2025-09-16 PROCEDURE — G2211 COMPLEX E/M VISIT ADD ON: CPT | Mod: NC

## 2025-09-16 PROCEDURE — 99214 OFFICE O/P EST MOD 30 MIN: CPT

## 2025-09-16 PROCEDURE — 93000 ELECTROCARDIOGRAM COMPLETE: CPT

## (undated) DEVICE — VENODYNE/SCD SLEEVE CALF MEDIUM

## (undated) DEVICE — DRSG MASTISOL

## (undated) DEVICE — MEDTRONIC CATHETER PASSER 38CM

## (undated) DEVICE — DRSG TELFA 3 X 8

## (undated) DEVICE — SOL IRR BAG NS 0.9% 1000ML

## (undated) DEVICE — CANISTER DISPOSABLE THIN WALL 3000CC

## (undated) DEVICE — DRAPE TOWEL BLUE STICKY

## (undated) DEVICE — DRAPE 3/4 SHEET 52X76"

## (undated) DEVICE — LABELS BLANK W PEN

## (undated) DEVICE — DRAPE INSTRUMENT POUCH 6.75" X 11"

## (undated) DEVICE — SUT MONOCRYL 4-0 27" PS-2 UNDYED

## (undated) DEVICE — PACK ADULT HERNIA

## (undated) DEVICE — GLV 7.5 PROTEXIS (CREAM) MICRO

## (undated) DEVICE — SPONGE PEANUT AUTO COUNT

## (undated) DEVICE — SLV COMPRESSION KNEE MED

## (undated) DEVICE — STAPLER SKIN VISI-STAT 35 WIDE

## (undated) DEVICE — WARMING BLANKET FULL ADULT

## (undated) DEVICE — DRAPE CAMERA ENDOMATE

## (undated) DEVICE — POSITIONER STRAP ARMBOARD VELCRO TS-30

## (undated) DEVICE — DRAPE FLUID WARMER 44 X 44"

## (undated) DEVICE — CATH IV SAFE BC 20G X 1.16" (PINK)

## (undated) DEVICE — DRSG TEGADERM 2.5X3"

## (undated) DEVICE — BIPOLAR FORCEP KIRWAN JEWELERS STR 4" X 0.4MM W 12FT CORD (GREEN)

## (undated) DEVICE — BASIN SET DOUBLE

## (undated) DEVICE — DRSG CURITY GAUZE SPONGE 4 X 4" 12-PLY

## (undated) DEVICE — PREP CHLORAPREP HI-LITE ORANGE 26ML

## (undated) DEVICE — SOL ANTI FOG

## (undated) DEVICE — PRASS PAIRED EMG ELECT 18MM

## (undated) DEVICE — SPECIMEN CONTAINER 100ML

## (undated) DEVICE — PACK HEAD & NECK

## (undated) DEVICE — SLING SHOULDER IMMOBILIZER CLINIC MEDIUM

## (undated) DEVICE — SUT SILK 2-0 30" SH

## (undated) DEVICE — DRSG TEGADERM + PAD 3.5X4"

## (undated) DEVICE — VESSEL LOOP MINI-BLUE 0.075" X 16"

## (undated) DEVICE — ELCTR BOVIE PENCIL SMOKE EVACUATION

## (undated) DEVICE — SUT SILK 3-0 30" SH

## (undated) DEVICE — TUBING SUCTION NONCONDUCTIVE 6MM X 12FT

## (undated) DEVICE — DRSG EYE PAD OVAL STERILE

## (undated) DEVICE — SUT POLYSORB 3-0 30" V-20 UNDYED

## (undated) DEVICE — BLADE MEDTRONIC ENT SKIMMER NON-ROTATABLE 15 DEGREE 3.5MM X 22.5CM

## (undated) DEVICE — SOL IRR POUR H2O 500ML

## (undated) DEVICE — WARMING BLANKET UPPER ADULT

## (undated) DEVICE — SUT MAXON 0 30" GS-22

## (undated) DEVICE — SYR LUER LOK 10CC

## (undated) DEVICE — VESSEL LOOP MAXI-YELLOW  0.120" X 16"

## (undated) DEVICE — MARKING PEN W RULER

## (undated) DEVICE — MEDICATION LABELS W MARKER

## (undated) DEVICE — BLADE MEDTRONIC ENT SKIMMER ROTATABLE 15 DEGREE 2.9MM X 22CM

## (undated) DEVICE — POSITIONER FOAM EGG CRATE ULNAR 2PCS (PINK)

## (undated) DEVICE — DRAPE SPLIT SHEET 77" X 120"

## (undated) DEVICE — SUT VICRYL 4-0 27" RB-1 UNDYED

## (undated) DEVICE — DRAPE IOBAN 23" X 23"

## (undated) DEVICE — DRSG STERISTRIPS 0.5 X 4"

## (undated) DEVICE — DRAPE TOWEL BLUE 17" X 24"

## (undated) DEVICE — SUT SURGIPRO 1 30" GS-21

## (undated) DEVICE — Device

## (undated) DEVICE — GLV 7.5 PROTEXIS (WHITE)

## (undated) DEVICE — TAPE SILK 3"

## (undated) DEVICE — BLADE SCALPEL SAFETYLOCK #15

## (undated) DEVICE — DRAPE IOBAN 33" X 23"

## (undated) DEVICE — SUT SILK 3-0 18" TIES

## (undated) DEVICE — SOL IRR POUR H2O 250ML

## (undated) DEVICE — CATH IV SAFE INSYTE 14G X 1.75" (ORANGE)

## (undated) DEVICE — DRAPE PROBE KOVER STERILE GEL 4 X 48

## (undated) DEVICE — ELCTR GROUNDING PAD ADULT COVIDIEN

## (undated) DEVICE — ELCTR BIPOLAR STIMULATOR PROBE SIDE-BY-SIDE

## (undated) DEVICE — SOL IRR POUR NS 0.9% 500ML